# Patient Record
Sex: MALE | Race: WHITE | NOT HISPANIC OR LATINO | Employment: FULL TIME | URBAN - METROPOLITAN AREA
[De-identification: names, ages, dates, MRNs, and addresses within clinical notes are randomized per-mention and may not be internally consistent; named-entity substitution may affect disease eponyms.]

---

## 2018-06-19 ENCOUNTER — OFFICE VISIT (OUTPATIENT)
Dept: MEDICAL GROUP | Facility: CLINIC | Age: 33
End: 2018-06-19
Payer: COMMERCIAL

## 2018-06-19 VITALS
SYSTOLIC BLOOD PRESSURE: 134 MMHG | HEART RATE: 84 BPM | OXYGEN SATURATION: 96 % | HEIGHT: 70 IN | TEMPERATURE: 98.2 F | RESPIRATION RATE: 16 BRPM | DIASTOLIC BLOOD PRESSURE: 68 MMHG | BODY MASS INDEX: 32.07 KG/M2 | WEIGHT: 224 LBS

## 2018-06-19 DIAGNOSIS — E66.9 OBESITY (BMI 30-39.9): ICD-10-CM

## 2018-06-19 DIAGNOSIS — Z11.1 TUBERCULOSIS SCREENING: ICD-10-CM

## 2018-06-19 DIAGNOSIS — Z01.84 IMMUNITY STATUS TESTING: ICD-10-CM

## 2018-06-19 DIAGNOSIS — F17.200 SMOKER: ICD-10-CM

## 2018-06-19 DIAGNOSIS — Z00.00 ROUTINE GENERAL MEDICAL EXAMINATION AT A HEALTH CARE FACILITY: ICD-10-CM

## 2018-06-19 DIAGNOSIS — Z23 NEED FOR VACCINATION: ICD-10-CM

## 2018-06-19 PROBLEM — Z87.09 HISTORY OF ASTHMA: Status: ACTIVE | Noted: 2018-06-19

## 2018-06-19 PROBLEM — Z87.09 HISTORY OF ASTHMA: Status: RESOLVED | Noted: 2018-06-19 | Resolved: 2018-06-19

## 2018-06-19 PROCEDURE — 90715 TDAP VACCINE 7 YRS/> IM: CPT | Performed by: NURSE PRACTITIONER

## 2018-06-19 PROCEDURE — 99395 PREV VISIT EST AGE 18-39: CPT | Mod: 25 | Performed by: NURSE PRACTITIONER

## 2018-06-19 PROCEDURE — 90471 IMMUNIZATION ADMIN: CPT | Performed by: NURSE PRACTITIONER

## 2018-06-19 ASSESSMENT — PATIENT HEALTH QUESTIONNAIRE - PHQ9: CLINICAL INTERPRETATION OF PHQ2 SCORE: 0

## 2018-06-19 NOTE — PROGRESS NOTES
"CC: Annual Exam (plus vaccines;)        HPI:     Patricio presents today for the followin. Routine general medical examination at a health care facility  Here today stating requires an annual most specifically vaccine update for entry into [employer].  Plans to pursue a nursing degree.  He does not have any forms etc. however states he does not have any of his vaccine history.  He last went to high school in [place] and does not have access to any vaccine history at that time.  He does have a history of chickenpox as a child    2. Smoker  Not interested in quitting    3. Tuberculosis screening  Requires tuberculosis testing prior to entry.  He does have a history of a positive PPD, negative chest x-ray.  He has never been symptomatic and has no known exposure    4. Immunity status testing  Requires immunity testing as we do not have access to his previous immunization records.    5. Need for vaccination  Needs Tdap    6. Obesity (BMI 30-39.9)  Weight is currently down    Current Outpatient Prescriptions   Medication Sig Dispense Refill   • albuterol (VENTOLIN OR PROVENTIL) 108 (90 BASE) MCG/ACT AERS Inhale 2 Puffs by mouth every 6 hours as needed for Shortness of Breath. 8.5 g 3   • ibuprofen (MOTRIN) 800 MG TABS Take 1 Tab by mouth every 8 hours as needed for Mild Pain. 30 Tab 0     No current facility-administered medications for this visit.      Social History   Substance Use Topics   • Smoking status: Current Every Day Smoker     Packs/day: 1.00     Types: Cigarettes   • Smokeless tobacco: Never Used   • Alcohol use No     I reviewed patients allergies, problem list and medications today in EPIC.    ROS: Any/all pertinent positives listed in the HPI, otherwise all others reviewed are negative today.      /68   Pulse 84   Temp 36.8 °C (98.2 °F)   Resp 16   Ht 1.778 m (5' 10\")   Wt 101.6 kg (224 lb)   SpO2 96%   BMI 32.14 kg/m²     Exam:    Gen: Alert and oriented, No apparent distress. " WDWN  Psych: A+Ox3, normal affect and mood  Skin: Warm, dry and intact. Good turgor   No rashes in visible areas.  Eye: Conjunctiva clear, lids normal  ENMT: Lips without lesions, good dentition  Lungs: Clear to auscultation bilaterally, no rales or rhonchi   Unlabored respiratory effort.   CV: Regular rate and rhythm, S1, S2. No murmurs.   No Edema         Assessment and Plan.   32 y.o. male with the following issues.    1. Routine general medical examination at a health care facility  Physical normal.  Patient declines need for can panel her other routine blood work to be ordered    2. Smoker  Cessation encouraged    3. Tuberculosis screening  Ordered  - QUANTIFERON TB GOLD (IN TUBE)    4. Immunity status testing  Ordered  - HEP B SURFACE ANTIGEN; Future  - MUMPS AB IGG  - RUBELLA ABS IGG; Future  - RUBEOLA IGG AB; Future  - VARICELLA ZOSTER IGG AB    5. Need for vaccination  I have placed the below orders and discussed them with an approved delegating provider. The MA is performing the below orders under the direction of an office MD.  -Given today.  - TDAP VACCINE =>6YO IM    6. Obesity (BMI 30-39.9)  Weight is currently down  - Patient identified as having weight management issue.  Appropriate orders and counseling given.

## 2018-06-20 ENCOUNTER — HOSPITAL ENCOUNTER (OUTPATIENT)
Dept: LAB | Facility: MEDICAL CENTER | Age: 33
End: 2018-06-20
Attending: NURSE PRACTITIONER
Payer: COMMERCIAL

## 2018-06-20 DIAGNOSIS — Z01.84 IMMUNITY STATUS TESTING: ICD-10-CM

## 2018-06-20 LAB
HBV SURFACE AG SER QL: NEGATIVE
RUBV AB SER QL: 114.6 IU/ML

## 2018-06-20 PROCEDURE — 86480 TB TEST CELL IMMUN MEASURE: CPT

## 2018-06-20 PROCEDURE — 86735 MUMPS ANTIBODY: CPT

## 2018-06-20 PROCEDURE — 87340 HEPATITIS B SURFACE AG IA: CPT

## 2018-06-20 PROCEDURE — 86762 RUBELLA ANTIBODY: CPT

## 2018-06-20 PROCEDURE — 36415 COLL VENOUS BLD VENIPUNCTURE: CPT

## 2018-06-20 PROCEDURE — 86787 VARICELLA-ZOSTER ANTIBODY: CPT

## 2018-06-20 PROCEDURE — 86765 RUBEOLA ANTIBODY: CPT

## 2018-06-22 LAB
M TB TUBERC IFN-G BLD QL: NEGATIVE
M TB TUBERC IFN-G/MITOGEN IGNF BLD: -0
M TB TUBERC IGNF/MITOGEN IGNF CONTROL: 59.71 [IU]/ML
MEV IGG SER IA-ACNC: 2.23
MITOGEN IGNF BCKGRD COR BLD-ACNC: 0.03 [IU]/ML
MUV IGG SER IA-ACNC: 0.7
VZV IGG SER IA-ACNC: 3.41

## 2019-02-19 ENCOUNTER — OFFICE VISIT (OUTPATIENT)
Dept: MEDICAL GROUP | Age: 34
End: 2019-02-19
Payer: COMMERCIAL

## 2019-02-19 VITALS
SYSTOLIC BLOOD PRESSURE: 116 MMHG | WEIGHT: 216.6 LBS | OXYGEN SATURATION: 95 % | HEIGHT: 70 IN | BODY MASS INDEX: 31.01 KG/M2 | DIASTOLIC BLOOD PRESSURE: 78 MMHG | TEMPERATURE: 97.5 F | RESPIRATION RATE: 16 BRPM | HEART RATE: 88 BPM

## 2019-02-19 DIAGNOSIS — Z00.00 HEALTHCARE MAINTENANCE: ICD-10-CM

## 2019-02-19 DIAGNOSIS — F17.200 TOBACCO DEPENDENCE: ICD-10-CM

## 2019-02-19 DIAGNOSIS — Z23 NEEDS FLU SHOT: ICD-10-CM

## 2019-02-19 DIAGNOSIS — E66.9 OBESITY (BMI 30-39.9): ICD-10-CM

## 2019-02-19 PROCEDURE — 90471 IMMUNIZATION ADMIN: CPT | Performed by: INTERNAL MEDICINE

## 2019-02-19 PROCEDURE — 90686 IIV4 VACC NO PRSV 0.5 ML IM: CPT | Performed by: INTERNAL MEDICINE

## 2019-02-19 PROCEDURE — 99203 OFFICE O/P NEW LOW 30 MIN: CPT | Mod: 25 | Performed by: INTERNAL MEDICINE

## 2019-02-19 RX ORDER — BUPROPION HYDROCHLORIDE 150 MG/1
150 TABLET, EXTENDED RELEASE ORAL 2 TIMES DAILY
Qty: 60 TAB | Refills: 5 | Status: SHIPPED | OUTPATIENT
Start: 2019-02-19 | End: 2019-11-04

## 2019-02-19 ASSESSMENT — ENCOUNTER SYMPTOMS
RESPIRATORY NEGATIVE: 1
MUSCULOSKELETAL NEGATIVE: 1
EYES NEGATIVE: 1
NEUROLOGICAL NEGATIVE: 1
CONSTITUTIONAL NEGATIVE: 1
CARDIOVASCULAR NEGATIVE: 1
GASTROINTESTINAL NEGATIVE: 1

## 2019-02-19 ASSESSMENT — PATIENT HEALTH QUESTIONNAIRE - PHQ9: CLINICAL INTERPRETATION OF PHQ2 SCORE: 0

## 2019-02-19 ASSESSMENT — LIFESTYLE VARIABLES: SUBSTANCE_ABUSE: 1

## 2019-02-19 NOTE — PROGRESS NOTES
Subjective:   This is a new patient to me.  He has not established with new PCP.  Would like to get established with me.  Last seen by me 4 years ago.  So therefore new patient.  Patricio Lundberg is a 33 y.o. male who presents with Nicotine Dependence (help to quit smoking) and Elbow Injury (shooting pain in left elbow)        HPI    The patient is here for followup of chronic medical problems listed below. The patient is compliant with medications and having no side effects from them. Denies chest pain, abdominal pain, dyspnea, myalgias, or cough.    Patient would like to quit smoking. He states he has tried Chantix in the past, but it gave him nightmares. He has never tried Wellbutrin.    Patient is requesting influenza vaccine.    Patient Active Problem List   Diagnosis   • Smoker   • Obesity (BMI 30-39.9)   • Tobacco dependence       Outpatient Medications Prior to Visit   Medication Sig Dispense Refill   • albuterol (VENTOLIN OR PROVENTIL) 108 (90 BASE) MCG/ACT AERS Inhale 2 Puffs by mouth every 6 hours as needed for Shortness of Breath. (Patient not taking: Reported on 2/19/2019) 8.5 g 3   • ibuprofen (MOTRIN) 800 MG TABS Take 1 Tab by mouth every 8 hours as needed for Mild Pain. 30 Tab 0     No facility-administered medications prior to visit.         Allergies   Allergen Reactions   • Toradol Swelling     Per patient he felt like his throat was swelling shut.  After 10 minutes the sensation resolved       Review of Systems   Constitutional: Negative.    HENT: Negative.    Eyes: Negative.    Respiratory: Negative.    Cardiovascular: Negative.    Gastrointestinal: Negative.    Genitourinary: Negative.    Musculoskeletal: Negative.    Skin: Negative.    Neurological: Negative.    Endo/Heme/Allergies: Negative.    Psychiatric/Behavioral: Positive for substance abuse (nicotine).   All other systems reviewed and are negative.           Objective:     /78 (BP Location: Left arm, Patient Position:  "Sitting)   Pulse 88   Temp 36.4 °C (97.5 °F) (Temporal)   Resp 16   Ht 1.778 m (5' 10\")   Wt 98.2 kg (216 lb 9.6 oz)   SpO2 95%   BMI 31.08 kg/m²     Physical Exam   Constitutional: Oriented to person, place, and time. Appears well-developed and well-nourished. No distress.   Head: Normocephalic and atraumatic.   Right Ear: External ear normal.   Left Ear: External ear normal.   Nose: Nose normal.   Mouth/Throat: Oropharynx is clear and moist. No oropharyngeal exudate.   Eyes: Pupils are equal, round, and reactive to light. Conjunctivae and EOM are normal. Right eye exhibits no discharge. Left eye exhibits no discharge. No scleral icterus.   Neck: Normal range of motion. Neck supple. No JVD present. No tracheal deviation present. No thyromegaly present.   Cardiovascular: Normal rate, regular rhythm, normal heart sounds and intact distal pulses.  Exam reveals no gallop and no friction rub.    No murmur heard.  Pulmonary/Chest: Effort normal. No stridor. No respiratory distress. No wheezing or rales. No tenderness.   Abdominal: Soft. Bowel sounds are normal. No distension and no mass. There is no tenderness. There is no rebound and no guarding. No hernia.   Musculoskeletal: Normal range of motion No edema or tenderness.   Lymphadenopathy: No cervical adenopathy.   Neurological: Alert and oriented to person, place, and time. Normal reflexes. Normal reflexes. No cranial nerve deficit. Normal muscle tone. Coordination normal.   Skin: Skin is warm and dry. No rash noted. Not diaphoretic. No erythema. No pallor.   Psychiatric: Normal mood and affect. Behavior is normal. Judgment and thought content normal.   Nursing note and vitals reviewed.      No results found for: HBA1C  Lab Results   Component Value Date/Time    SODIUM 134 (L) 09/27/2008 12:30 PM    POTASSIUM 4.2 09/27/2008 12:30 PM    CHLORIDE 104 09/27/2008 12:30 PM    CO2 22 09/27/2008 12:30 PM    GLUCOSE 87 09/27/2008 12:30 PM    BUN 14 09/27/2008 12:30 PM "    CREATININE 1.0 09/27/2008 12:30 PM    ALKPHOSPHAT 78 09/27/2008 12:30 PM    ASTSGOT 33 09/27/2008 12:30 PM    ALTSGPT 49 09/27/2008 12:30 PM    TBILIRUBIN 0.7 09/27/2008 12:30 PM     No results found for: INR  No results found for: CHOLSTRLTOT, LDL, HDL, TRIGLYCERIDE    No results found for: TESTOSTERONE  No results found for: TSH  No results found for: FREET4  No results found for: URICACID  No components found for: VITB12  No results found for: 25HYDROXY       Assessment/Plan:     1. Tobacco dependence  Patient would like to quit smoking. Could not tolerate Chantix. Plan to evaluate with:    - buPROPion SR (WELLBUTRIN-SR) 150 MG TABLET SR 12 HR sustained-release tablet; Take 1 Tab by mouth 2 times a day.  Dispense: 60 Tab; Refill: 5    2. Needs flu shot  - Influenza Vaccine Quad Injection >3Y (PF)    3. Obesity (BMI 30-39.9)  Patient counseled on diet, exercise, 15 pounds of weight loss.    4. Healthcare maintenance-we will schedule patient for 2-month follow-up on smoking cessation and for annual health maintenance.  Plan to evaluate with:    - Comp Metabolic Panel; Future  - Lipid Profile; Future  - CBC WITH DIFFERENTIAL; Future      30 minute face-to-face encounter took place today.  More than half of this time was spent in the coordination of care of the above problems, as well as counseling.     Bernarda POWELL (Sravani), am scribing for, and in the presence of, Patricio Zarate M.D..    Electronically signed by: Bernarda Atwood (Sravani), 2/19/2019    Patricio POWELL M.D., personally performed the services described in this documentation, as scribed by Bernarda Atwood in my presence, and it is both accurate and complete.

## 2019-04-16 ENCOUNTER — TELEPHONE (OUTPATIENT)
Dept: MEDICAL GROUP | Age: 34
End: 2019-04-16

## 2019-04-16 DIAGNOSIS — Z11.1 SCREENING FOR TUBERCULOSIS: ICD-10-CM

## 2019-04-16 NOTE — TELEPHONE ENCOUNTER
1. Name: Marek Lundberg    Call Back Number: [phone]   Patient approves a detailed voicemail message: yes    2. Patient is requesting orders for TB Test    3. Clinical Reason for Request: N/A    4. Specialty & Provider Name/Lab/Imaging Location Preference: N/A    5. Is appointment scheduled for requested order/referral: yes -    Patient was informed they may receive a return phone call from our office with any additional questions before processing this request.

## 2019-04-17 ENCOUNTER — HOSPITAL ENCOUNTER (OUTPATIENT)
Dept: LAB | Facility: MEDICAL CENTER | Age: 34
End: 2019-04-17
Attending: INTERNAL MEDICINE
Payer: COMMERCIAL

## 2019-04-17 DIAGNOSIS — Z11.1 SCREENING FOR TUBERCULOSIS: ICD-10-CM

## 2019-04-17 DIAGNOSIS — Z00.00 HEALTHCARE MAINTENANCE: ICD-10-CM

## 2019-04-17 LAB
ALBUMIN SERPL BCP-MCNC: 4.2 G/DL (ref 3.2–4.9)
ALBUMIN/GLOB SERPL: 1.6 G/DL
ALP SERPL-CCNC: 74 U/L (ref 30–99)
ALT SERPL-CCNC: 54 U/L (ref 2–50)
ANION GAP SERPL CALC-SCNC: 6 MMOL/L (ref 0–11.9)
AST SERPL-CCNC: 27 U/L (ref 12–45)
BASOPHILS # BLD AUTO: 1 % (ref 0–1.8)
BASOPHILS # BLD: 0.08 K/UL (ref 0–0.12)
BILIRUB SERPL-MCNC: 0.5 MG/DL (ref 0.1–1.5)
BUN SERPL-MCNC: 14 MG/DL (ref 8–22)
CALCIUM SERPL-MCNC: 8.9 MG/DL (ref 8.5–10.5)
CHLORIDE SERPL-SCNC: 101 MMOL/L (ref 96–112)
CHOLEST SERPL-MCNC: 180 MG/DL (ref 100–199)
CO2 SERPL-SCNC: 27 MMOL/L (ref 20–33)
CREAT SERPL-MCNC: 0.89 MG/DL (ref 0.5–1.4)
EOSINOPHIL # BLD AUTO: 0.53 K/UL (ref 0–0.51)
EOSINOPHIL NFR BLD: 6.5 % (ref 0–6.9)
ERYTHROCYTE [DISTWIDTH] IN BLOOD BY AUTOMATED COUNT: 40.6 FL (ref 35.9–50)
FASTING STATUS PATIENT QL REPORTED: NORMAL
GLOBULIN SER CALC-MCNC: 2.6 G/DL (ref 1.9–3.5)
GLUCOSE SERPL-MCNC: 88 MG/DL (ref 65–99)
HCT VFR BLD AUTO: 52.3 % (ref 42–52)
HDLC SERPL-MCNC: 35 MG/DL
HGB BLD-MCNC: 16.8 G/DL (ref 14–18)
IMM GRANULOCYTES # BLD AUTO: 0.02 K/UL (ref 0–0.11)
IMM GRANULOCYTES NFR BLD AUTO: 0.2 % (ref 0–0.9)
LDLC SERPL CALC-MCNC: 104 MG/DL
LYMPHOCYTES # BLD AUTO: 3.19 K/UL (ref 1–4.8)
LYMPHOCYTES NFR BLD: 39.2 % (ref 22–41)
MCH RBC QN AUTO: 29.2 PG (ref 27–33)
MCHC RBC AUTO-ENTMCNC: 32.1 G/DL (ref 33.7–35.3)
MCV RBC AUTO: 91 FL (ref 81.4–97.8)
MONOCYTES # BLD AUTO: 0.65 K/UL (ref 0–0.85)
MONOCYTES NFR BLD AUTO: 8 % (ref 0–13.4)
NEUTROPHILS # BLD AUTO: 3.66 K/UL (ref 1.82–7.42)
NEUTROPHILS NFR BLD: 45.1 % (ref 44–72)
NRBC # BLD AUTO: 0 K/UL
NRBC BLD-RTO: 0 /100 WBC
PLATELET # BLD AUTO: 235 K/UL (ref 164–446)
PMV BLD AUTO: 10.5 FL (ref 9–12.9)
POTASSIUM SERPL-SCNC: 4.3 MMOL/L (ref 3.6–5.5)
PROT SERPL-MCNC: 6.8 G/DL (ref 6–8.2)
RBC # BLD AUTO: 5.75 M/UL (ref 4.7–6.1)
SODIUM SERPL-SCNC: 134 MMOL/L (ref 135–145)
TRIGL SERPL-MCNC: 207 MG/DL (ref 0–149)
WBC # BLD AUTO: 8.1 K/UL (ref 4.8–10.8)

## 2019-04-17 PROCEDURE — 80053 COMPREHEN METABOLIC PANEL: CPT

## 2019-04-17 PROCEDURE — 36415 COLL VENOUS BLD VENIPUNCTURE: CPT

## 2019-04-17 PROCEDURE — 85025 COMPLETE CBC W/AUTO DIFF WBC: CPT

## 2019-04-17 PROCEDURE — 86480 TB TEST CELL IMMUN MEASURE: CPT

## 2019-04-17 PROCEDURE — 80061 LIPID PANEL: CPT

## 2019-04-19 LAB
GAMMA INTERFERON BACKGROUND BLD IA-ACNC: 0.02 IU/ML
M TB IFN-G BLD-IMP: NEGATIVE
M TB IFN-G CD4+ BCKGRND COR BLD-ACNC: 0 IU/ML
MITOGEN IGNF BCKGRD COR BLD-ACNC: >10 IU/ML
QFT TB2 - NIL TBQ2: -0.01 IU/ML

## 2019-04-23 ENCOUNTER — OFFICE VISIT (OUTPATIENT)
Dept: MEDICAL GROUP | Age: 34
End: 2019-04-23
Payer: COMMERCIAL

## 2019-04-23 VITALS
TEMPERATURE: 98.1 F | HEIGHT: 70 IN | HEART RATE: 80 BPM | WEIGHT: 219.2 LBS | SYSTOLIC BLOOD PRESSURE: 102 MMHG | OXYGEN SATURATION: 93 % | DIASTOLIC BLOOD PRESSURE: 70 MMHG | BODY MASS INDEX: 31.38 KG/M2

## 2019-04-23 DIAGNOSIS — E66.9 OBESITY (BMI 30-39.9): ICD-10-CM

## 2019-04-23 DIAGNOSIS — E78.2 MIXED HYPERLIPIDEMIA: ICD-10-CM

## 2019-04-23 DIAGNOSIS — Z00.00 ROUTINE GENERAL MEDICAL EXAMINATION AT A HEALTH CARE FACILITY: ICD-10-CM

## 2019-04-23 DIAGNOSIS — F17.200 TOBACCO DEPENDENCE: ICD-10-CM

## 2019-04-23 DIAGNOSIS — R74.8 ELEVATED LIVER ENZYMES: ICD-10-CM

## 2019-04-23 DIAGNOSIS — Z23 NEED FOR VACCINATION: ICD-10-CM

## 2019-04-23 PROCEDURE — 90732 PPSV23 VACC 2 YRS+ SUBQ/IM: CPT | Performed by: INTERNAL MEDICINE

## 2019-04-23 PROCEDURE — 90471 IMMUNIZATION ADMIN: CPT | Performed by: INTERNAL MEDICINE

## 2019-04-23 PROCEDURE — 99214 OFFICE O/P EST MOD 30 MIN: CPT | Mod: 25 | Performed by: INTERNAL MEDICINE

## 2019-04-23 PROCEDURE — 90746 HEPB VACCINE 3 DOSE ADULT IM: CPT | Performed by: INTERNAL MEDICINE

## 2019-04-23 PROCEDURE — 90472 IMMUNIZATION ADMIN EACH ADD: CPT | Performed by: INTERNAL MEDICINE

## 2019-04-23 ASSESSMENT — ENCOUNTER SYMPTOMS
RESPIRATORY NEGATIVE: 1
GASTROINTESTINAL NEGATIVE: 1
PSYCHIATRIC NEGATIVE: 1
CARDIOVASCULAR NEGATIVE: 1
NEUROLOGICAL NEGATIVE: 1
CONSTITUTIONAL NEGATIVE: 1
MUSCULOSKELETAL NEGATIVE: 1
EYES NEGATIVE: 1

## 2019-04-23 NOTE — PROGRESS NOTES
"Subjective:      Patricio Lundberg is a 33 y.o. male who presents with Annual Exam        HPI    Physical exam  Patient presents today for a physical for school and for his vaccination records. He is up to date on all his vaccinations aside from the hepatitis B series.     Patient Active Problem List   Diagnosis   • Smoker   • Obesity (BMI 30-39.9)   • Tobacco dependence       Outpatient Medications Prior to Visit   Medication Sig Dispense Refill   • albuterol (VENTOLIN OR PROVENTIL) 108 (90 BASE) MCG/ACT AERS Inhale 2 Puffs by mouth every 6 hours as needed for Shortness of Breath. 8.5 g 3   • ibuprofen (MOTRIN) 800 MG TABS Take 1 Tab by mouth every 8 hours as needed for Mild Pain. 30 Tab 0   • buPROPion SR (WELLBUTRIN-SR) 150 MG TABLET SR 12 HR sustained-release tablet Take 1 Tab by mouth 2 times a day. (Patient not taking: Reported on 4/23/2019) 60 Tab 5     No facility-administered medications prior to visit.         Allergies   Allergen Reactions   • Toradol Swelling     Per patient he felt like his throat was swelling shut.  After 10 minutes the sensation resolved       Review of Systems   Constitutional: Negative.    HENT: Negative.    Eyes: Negative.    Respiratory: Negative.    Cardiovascular: Negative.    Gastrointestinal: Negative.    Genitourinary: Negative.    Musculoskeletal: Negative.    Skin: Negative.    Neurological: Negative.    Endo/Heme/Allergies: Negative.    Psychiatric/Behavioral: Negative.    All other systems reviewed and are negative.           Objective:     /70 (BP Location: Left arm, Patient Position: Sitting, BP Cuff Size: Adult)   Pulse 80   Temp 36.7 °C (98.1 °F) (Temporal)   Ht 1.778 m (5' 10\")   Wt 99.4 kg (219 lb 3.2 oz)   SpO2 93%   BMI 31.45 kg/m²     Physical Exam   Constitutional: Oriented to person, place, and time. Appears well-developed and well-nourished. No distress.   Head: Normocephalic and atraumatic.   Right Ear: External ear normal.   Left Ear: " External ear normal.   Nose: Nose normal.   Mouth/Throat: Oropharynx is clear and moist. No oropharyngeal exudate.   Eyes: Pupils are equal, round, and reactive to light. Conjunctivae and EOM are normal. Right eye exhibits no discharge. Left eye exhibits no discharge. No scleral icterus.   Neck: Normal range of motion. Neck supple. No JVD present. No tracheal deviation present. No thyromegaly present.   Cardiovascular: Normal rate, regular rhythm, normal heart sounds and intact distal pulses.  Exam reveals no gallop and no friction rub.    No murmur heard.  Pulmonary/Chest: Effort normal. No stridor. No respiratory distress. No wheezing or rales. No tenderness.   Abdominal: Soft. Bowel sounds are normal. No distension and no mass. There is no tenderness. There is no rebound and no guarding. No hernia.   Musculoskeletal: Normal range of motion No edema or tenderness.   Lymphadenopathy: No cervical adenopathy.   Neurological: Alert and oriented to person, place, and time. Normal reflexes. Normal reflexes. No cranial nerve deficit. Normal muscle tone. Coordination normal.   Skin: Skin is warm and dry. No rash noted. Not diaphoretic. No erythema. No pallor.   Psychiatric: Normal mood and affect. Behavior is normal. Judgment and thought content normal.   Nursing note and vitals reviewed.      No results found for: HBA1C  Lab Results   Component Value Date/Time    SODIUM 134 (L) 04/17/2019 08:42 AM    POTASSIUM 4.3 04/17/2019 08:42 AM    CHLORIDE 101 04/17/2019 08:42 AM    CO2 27 04/17/2019 08:42 AM    GLUCOSE 88 04/17/2019 08:42 AM    BUN 14 04/17/2019 08:42 AM    CREATININE 0.89 04/17/2019 08:42 AM    CREATININE 1.0 09/27/2008 12:30 PM    ALKPHOSPHAT 74 04/17/2019 08:42 AM    ASTSGOT 27 04/17/2019 08:42 AM    ALTSGPT 54 (H) 04/17/2019 08:42 AM    TBILIRUBIN 0.5 04/17/2019 08:42 AM     No results found for: INR  Lab Results   Component Value Date/Time    CHOLSTRLTOT 180 04/17/2019 08:42 AM     (H) 04/17/2019 08:42  AM    HDL 35 (A) 04/17/2019 08:42 AM    TRIGLYCERIDE 207 (H) 04/17/2019 08:42 AM       No results found for: TESTOSTERONE  No results found for: TSH  No results found for: FREET4  No results found for: URICACID  No components found for: VITB12  No results found for: 25HYDROXY       Assessment/Plan:     1. Routine general medical examination at a health care facility  Conducted physical examination, findings above.      2. Need for vaccination  Ordered first hepatitis B vaccination in series.   - Pneumococal Polysaccharide Vaccine 23-Valent =>1YO SQ/IM  - Hepatitis B Vaccine Adult IM      3.Tobacco dependence  Patient counseled. Encouraged to quit tobacco products. NicoDerm prescribed.      4. Obesity (BMI 30-39.9)   diet/exercise/lose 15 lbs.; patient counseled      5. Elevated liver enzymes- borderline; needs angela 3 mos; prob fatty liver- will do US liver if it persists   diet/exercise/lose 15 lbs.; patient counseled; kck ferritin and hep c jodi      6. Mixed hyperlipidemia- mild no meds   diet/exercise/lose 15 lbs.; patient counseled                          30 minute face-to-face encounter took place today.  More than half of this time was spent in the coordination of care of the above problems, as well as counseling.     IJean-Paul (Scribe), am scribing for, and in the presence of, Patricio Zarate M.D..    Electronically signed by: Jean-Paul Church (Scribe), 4/23/2019    IPatricio M.D., personally performed the services described in this documentation, as scribed by Jean-Paul Church in my presence, and it is both accurate and complete.

## 2019-04-23 NOTE — LETTER
April 23, 2019         Patient: Patricio Lundberg   YOB: 1985   Date of Visit: 4/23/2019           To Whom it May Concern:    Patricio Lundberg was seen in my clinic on 4/23/2019. 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    If you have any questions or concerns, please don't hesitate to call.        Sincerely,           Patricio Zarate M.D.  Electronically Signed

## 2019-05-30 ENCOUNTER — TELEPHONE (OUTPATIENT)
Dept: MEDICAL GROUP | Age: 34
End: 2019-05-30

## 2019-05-30 ENCOUNTER — APPOINTMENT (OUTPATIENT)
Dept: MEDICAL GROUP | Age: 34
End: 2019-05-30
Payer: COMMERCIAL

## 2019-05-30 DIAGNOSIS — Z23 NEED FOR VACCINATION: ICD-10-CM

## 2019-08-29 ENCOUNTER — NON-PROVIDER VISIT (OUTPATIENT)
Dept: MEDICAL GROUP | Age: 34
End: 2019-08-29
Payer: COMMERCIAL

## 2019-08-29 DIAGNOSIS — Z23 NEED FOR VACCINATION: ICD-10-CM

## 2019-08-29 PROCEDURE — 90746 HEPB VACCINE 3 DOSE ADULT IM: CPT | Performed by: INTERNAL MEDICINE

## 2019-08-29 PROCEDURE — 90471 IMMUNIZATION ADMIN: CPT | Performed by: INTERNAL MEDICINE

## 2019-08-29 NOTE — PROGRESS NOTES
Subjective:      Patricio Lundberg is a 33 y.o. male who presents with No chief complaint on file.            HPI    ROS       Objective:     There were no vitals taken for this visit.     Physical Exam            Assessment/Plan:     1. Need for vaccination  ***  - Hepatitis B Vaccine Adult IM

## 2019-08-29 NOTE — PROGRESS NOTES
"Patricio Lundberg is a 33 y.o. male here for a non-provider visit for:   HEPATITIS B 3 of 3    Reason for immunization: continue or complete series started at the office  Immunization records indicate need for vaccine: Yes, confirmed with personal records  Minimum interval has been met for this vaccine: Yes  ABN completed: Not Indicated    Order and dose verified by: KIARA CH Dated  10/12/2018 was given to patient: Yes  All IAC Questionnaire questions were answered \"No.\"    Patient tolerated injection and no adverse effects were observed or reported: Yes    Pt scheduled for next dose in series: No    "

## 2019-09-17 ENCOUNTER — EMPLOYEE HEALTH (OUTPATIENT)
Dept: OCCUPATIONAL MEDICINE | Facility: CLINIC | Age: 34
End: 2019-09-17

## 2019-09-17 ENCOUNTER — HOSPITAL ENCOUNTER (OUTPATIENT)
Facility: MEDICAL CENTER | Age: 34
End: 2019-09-17
Attending: PREVENTIVE MEDICINE
Payer: COMMERCIAL

## 2019-09-17 ENCOUNTER — EH NON-PROVIDER (OUTPATIENT)
Dept: OCCUPATIONAL MEDICINE | Facility: CLINIC | Age: 34
End: 2019-09-17

## 2019-09-17 VITALS
DIASTOLIC BLOOD PRESSURE: 86 MMHG | SYSTOLIC BLOOD PRESSURE: 124 MMHG | BODY MASS INDEX: 31.64 KG/M2 | HEART RATE: 73 BPM | HEIGHT: 70 IN | WEIGHT: 221 LBS | OXYGEN SATURATION: 97 % | TEMPERATURE: 97.8 F

## 2019-09-17 DIAGNOSIS — Z02.1 PRE-EMPLOYMENT HEALTH SCREENING EXAMINATION: ICD-10-CM

## 2019-09-17 DIAGNOSIS — Z02.1 PRE-EMPLOYMENT DRUG SCREENING: ICD-10-CM

## 2019-09-17 LAB
AMP AMPHETAMINE: NORMAL
BAR BARBITURATES: NORMAL
BZO BENZODIAZEPINES: NORMAL
COC COCAINE: NORMAL
INT CON NEG: NORMAL
INT CON POS: NORMAL
MDMA ECSTASY: NORMAL
MET METHAMPHETAMINES: NORMAL
MTD METHADONE: NORMAL
OPI OPIATES: NORMAL
OXY OXYCODONE: NORMAL
PCP PHENCYCLIDINE: NORMAL
POC URINE DRUG SCREEN OCDRS: NEGATIVE
THC: NORMAL

## 2019-09-17 PROCEDURE — 8915 PR COMPREHENSIVE PHYSICAL: Performed by: NURSE PRACTITIONER

## 2019-09-17 PROCEDURE — 86480 TB TEST CELL IMMUN MEASURE: CPT | Performed by: PREVENTIVE MEDICINE

## 2019-09-17 PROCEDURE — 90686 IIV4 VACC NO PRSV 0.5 ML IM: CPT | Performed by: NURSE PRACTITIONER

## 2019-09-17 PROCEDURE — 80305 DRUG TEST PRSMV DIR OPT OBS: CPT | Performed by: PREVENTIVE MEDICINE

## 2019-09-18 LAB
GAMMA INTERFERON BACKGROUND BLD IA-ACNC: 0.03 IU/ML
M TB IFN-G BLD-IMP: NEGATIVE
M TB IFN-G CD4+ BCKGRND COR BLD-ACNC: 0 IU/ML
MITOGEN IGNF BCKGRD COR BLD-ACNC: >10 IU/ML
QFT TB2 - NIL TBQ2: 0 IU/ML

## 2019-09-20 ENCOUNTER — EH NON-PROVIDER (OUTPATIENT)
Dept: OCCUPATIONAL MEDICINE | Facility: CLINIC | Age: 34
End: 2019-09-20

## 2019-09-20 DIAGNOSIS — Z71.85 IMMUNIZATION COUNSELING: ICD-10-CM

## 2019-11-04 ENCOUNTER — OFFICE VISIT (OUTPATIENT)
Dept: MEDICAL GROUP | Age: 34
End: 2019-11-04
Payer: COMMERCIAL

## 2019-11-04 VITALS
HEART RATE: 79 BPM | DIASTOLIC BLOOD PRESSURE: 70 MMHG | HEIGHT: 70 IN | TEMPERATURE: 97.5 F | SYSTOLIC BLOOD PRESSURE: 118 MMHG | BODY MASS INDEX: 32.5 KG/M2 | OXYGEN SATURATION: 96 % | WEIGHT: 227 LBS

## 2019-11-04 DIAGNOSIS — F17.200 TOBACCO DEPENDENCE: ICD-10-CM

## 2019-11-04 DIAGNOSIS — E78.2 MIXED HYPERLIPIDEMIA: ICD-10-CM

## 2019-11-04 DIAGNOSIS — F98.8 ATTENTION DEFICIT DISORDER (ADD) WITHOUT HYPERACTIVITY: ICD-10-CM

## 2019-11-04 DIAGNOSIS — R74.8 ELEVATED LIVER ENZYMES: ICD-10-CM

## 2019-11-04 DIAGNOSIS — E66.9 OBESITY (BMI 30-39.9): ICD-10-CM

## 2019-11-04 DIAGNOSIS — G47.19 EXCESSIVE DAYTIME SLEEPINESS: ICD-10-CM

## 2019-11-04 DIAGNOSIS — R53.83 LOW ENERGY: ICD-10-CM

## 2019-11-04 PROCEDURE — 99214 OFFICE O/P EST MOD 30 MIN: CPT | Performed by: INTERNAL MEDICINE

## 2019-11-04 RX ORDER — MODAFINIL 100 MG/1
100 TABLET ORAL DAILY
Qty: 90 TAB | Refills: 0 | Status: SHIPPED | OUTPATIENT
Start: 2019-11-04 | End: 2019-11-18 | Stop reason: SDUPTHER

## 2019-11-04 ASSESSMENT — ENCOUNTER SYMPTOMS
NEUROLOGICAL NEGATIVE: 1
CARDIOVASCULAR NEGATIVE: 1
WEIGHT LOSS: 0
MUSCULOSKELETAL NEGATIVE: 1
DIAPHORESIS: 0
RESPIRATORY NEGATIVE: 1
CHILLS: 0
FEVER: 0
EYES NEGATIVE: 1
PSYCHIATRIC NEGATIVE: 1
GASTROINTESTINAL NEGATIVE: 1

## 2019-11-04 NOTE — PROGRESS NOTES
Subjective:      Patricio Lundberg is a 34 y.o. male who presents with Fatigue ( and concentation )        HPI    The patient is here for followup of chronic medical problems listed below. The patient is compliant with medications and having no side effects from them. Denies chest pain, abdominal pain, dyspnea, myalgias, or cough. He is additionally following up for his recent lab work results. He notes that he has received his flu vaccination this season.     1. Obesity (BMI 30-39.9)    The patient is attempting to manage through diet and exercise.    2. Tobacco dependence    He is managing tobacco dependence well.     3. Low energy  4. 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 He is requesting medication to alleviate these symptoms. He expresses concern with amphetamines.     5. 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 He is requesting medication to alleviate these symptoms. He expresses concern with amphetamines.           Patient Active Problem List   Diagnosis   • Obesity (BMI 30-39.9)   • Tobacco dependence   • Mixed hyperlipidemia- mild no meds   • Elevated liver enzymes- borderline; needs angela 3 mos; prob fatty liver       Outpatient Medications Prior to Visit   Medication Sig Dispense Refill   • buPROPion SR (WELLBUTRIN-SR) 150 MG TABLET SR 12 HR sustained-release tablet Take 1 Tab by mouth 2 times a day. (Patient not taking: Reported on 4/23/2019) 60 Tab 5   • albuterol (VENTOLIN OR PROVENTIL) 108 (90 BASE) MCG/ACT AERS Inhale 2 Puffs by mouth every 6 hours as needed for Shortness of Breath. (Patient not taking: Reported on 9/17/2019) 8.5 g 3   • ibuprofen (MOTRIN) 800 MG TABS Take 1 Tab by mouth every 8 hours as needed for Mild Pain.  "(Patient not taking: Reported on 9/17/2019) 30 Tab 0     No facility-administered medications prior to visit.         Allergies   Allergen Reactions   • Toradol Swelling     Per patient he felt like his throat was swelling shut.  After 10 minutes the sensation resolved       Review of Systems   Constitutional: Positive for malaise/fatigue. Negative for chills, diaphoresis, fever and weight loss.        Difficulty focusing   HENT: Negative.    Eyes: Negative.    Respiratory: Negative.    Cardiovascular: Negative.    Gastrointestinal: Negative.    Genitourinary: Negative.    Musculoskeletal: Negative.    Skin: Negative.    Neurological: Negative.    Psychiatric/Behavioral: Negative.    All other systems reviewed and are negative.           Objective:     /70 (BP Location: Left arm, Patient Position: Sitting, BP Cuff Size: Adult)   Pulse 79   Temp 36.4 °C (97.5 °F)   Ht 1.778 m (5' 10\")   Wt 103 kg (227 lb)   SpO2 96%   BMI 32.57 kg/m²     Physical Exam   Constitutional: Oriented to person, place, and time. Appears well-developed and well-nourished. No distress.   Head: Normocephalic and atraumatic.   Right Ear: External ear normal.   Left Ear: External ear normal.   Nose: Nose normal.   Mouth/Throat: Oropharynx is clear and moist. No oropharyngeal exudate.   Eyes: Pupils are equal, round, and reactive to light. Conjunctivae and EOM are normal. Right eye exhibits no discharge. Left eye exhibits no discharge. No scleral icterus.   Neck: Normal range of motion. Neck supple. No JVD present. No tracheal deviation present. No thyromegaly present.   Cardiovascular: Normal rate, regular rhythm, normal heart sounds and intact distal pulses.  Exam reveals no gallop and no friction rub.    No murmur heard.  Pulmonary/Chest: Effort normal. No stridor. No respiratory distress. No wheezing or rales. No tenderness.   Abdominal: Soft. Bowel sounds are normal. No distension and no mass. There is no tenderness. There is no " rebound and no guarding. No hernia.   Musculoskeletal: Normal range of motion No edema or tenderness.   Lymphadenopathy: No cervical adenopathy.   Neurological: Alert and oriented to person, place, and time. Normal reflexes. Normal reflexes. No cranial nerve deficit. Normal muscle tone. Coordination normal.   Skin: Skin is warm and dry. No rash noted. Not diaphoretic. No erythema. No pallor.   Psychiatric: Normal mood and affect. Behavior is normal. Judgment and thought content normal.   Nursing note and vitals reviewed.      No results found for: HBA1C  Lab Results   Component Value Date/Time    SODIUM 134 (L) 04/17/2019 08:42 AM    POTASSIUM 4.3 04/17/2019 08:42 AM    CHLORIDE 101 04/17/2019 08:42 AM    CO2 27 04/17/2019 08:42 AM    GLUCOSE 88 04/17/2019 08:42 AM    BUN 14 04/17/2019 08:42 AM    CREATININE 0.89 04/17/2019 08:42 AM    CREATININE 1.0 09/27/2008 12:30 PM    ALKPHOSPHAT 74 04/17/2019 08:42 AM    ASTSGOT 27 04/17/2019 08:42 AM    ALTSGPT 54 (H) 04/17/2019 08:42 AM    TBILIRUBIN 0.5 04/17/2019 08:42 AM     No results found for: INR  Lab Results   Component Value Date/Time    CHOLSTRLTOT 180 04/17/2019 08:42 AM     (H) 04/17/2019 08:42 AM    HDL 35 (A) 04/17/2019 08:42 AM    TRIGLYCERIDE 207 (H) 04/17/2019 08:42 AM       No results found for: TESTOSTERONE  No results found for: TSH  No results found for: FREET4  No results found for: URICACID  No components found for: VITB12  No results found for: 25HYDROXY       Assessment/Plan:     1. Elevated liver enzymes- borderline; needs angela 3 mos; prob fatty liver    He will receive blood work and a US-Abdomen for evaluation of his daytime sleepiness and low energy levels.     Results for GLORIA CURTIS PETERS (MRN [id number]) as of 11/4/2019 09:22   Ref. Range 4/17/2019 08:42   ALT(SGPT) Latest Ref Range: 2 - 50 U/L 54 (H)       - HEP C VIRUS ANTIBODY; Future  - US-ABDOMEN COMPLETE SURVEY; Future    2. Mixed hyperlipidemia- mild no meds    He will receive  blood work for reevaluation. Diet/exercise/lose 15 lbs.; patient counseled.    Results for CURTIS CASTRO (MRN [id number]) as of 11/4/2019 09:22   Ref. Range 4/17/2019 08:42   Cholesterol,Tot Latest Ref Range: 100 - 199 mg/dL 180   Triglycerides Latest Ref Range: 0 - 149 mg/dL 207 (H)   HDL Latest Ref Range: >=40 mg/dL 35 (A)   LDL Latest Ref Range: <100 mg/dL 104 (H)       - TSH; Future  - Comp Metabolic Panel; Future  - Lipid Profile; Future  - CRP HIGH SENSITIVE (CARDIAC); Future    3. Obesity (BMI 30-39.9)    Diet/exercise/lose 15 lbs.; patient counseled     5. Low energy  Acute Problem    He will receive blood work for evaluation of his daytime sleepiness and low energy levels. He will be prescribed with Vitamin B12, Folate, and Provigil 100 mg.      - TSH; Future  - Comp Metabolic Panel; Future  - CBC WITH DIFFERENTIAL; Future  - WESTERGREN SED RATE; Future  - TESTOSTERONE, FREE AND TOTAL; Future  - VITAMIN B12; Future  - FOLATE; Future  - modafinil (PROVIGIL) 100 MG Tab; Take 1 Tab by mouth every day for 90 days.  Dispense: 90 Tab; Refill: 0    6. Excessive daytime sleepiness  Acute Problem    He will be prescribed Provigil 100 mg. Diet/exercise/lose 15 lbs.; patient counseled     - modafinil (PROVIGIL) 100 MG Tab; Take 1 Tab by mouth every day for 90 days.  Dispense: 90 Tab; Refill: 0    7. Attention deficit disorder (ADD) without hyperactivity  - modafinil (PROVIGIL) 100 MG Tab; Take 1 Tab by mouth every day for 90 days.  Dispense: 90 Tab; Refill: 0    He will be prescribed Provigil 100 mg.        30 minute face-to-face encounter took place today.  More than half of this time was spent in the coordination of care of the above problems, as well as counseling.     Dominic POWELL (Sravani), am scribing for, and in the presence of, Curtis Zarate M.D..    Electronically signed by: Dominic Meza), 11/4/2019    Curtis POWELL M.D., personally performed the services described in this  documentation, as scribed by Dominic Butler in my presence, and it is both accurate and complete.

## 2019-11-14 ENCOUNTER — HOSPITAL ENCOUNTER (OUTPATIENT)
Dept: RADIOLOGY | Facility: MEDICAL CENTER | Age: 34
End: 2019-11-14
Attending: INTERNAL MEDICINE
Payer: COMMERCIAL

## 2019-11-14 ENCOUNTER — HOSPITAL ENCOUNTER (OUTPATIENT)
Dept: LAB | Facility: MEDICAL CENTER | Age: 34
End: 2019-11-14
Attending: INTERNAL MEDICINE
Payer: COMMERCIAL

## 2019-11-14 DIAGNOSIS — E78.2 MIXED HYPERLIPIDEMIA: ICD-10-CM

## 2019-11-14 DIAGNOSIS — R53.83 LOW ENERGY: ICD-10-CM

## 2019-11-14 DIAGNOSIS — R74.8 ELEVATED LIVER ENZYMES: ICD-10-CM

## 2019-11-14 LAB
ALBUMIN SERPL BCP-MCNC: 4.5 G/DL (ref 3.2–4.9)
ALBUMIN/GLOB SERPL: 1.7 G/DL
ALP SERPL-CCNC: 65 U/L (ref 30–99)
ALT SERPL-CCNC: 39 U/L (ref 2–50)
ANION GAP SERPL CALC-SCNC: 9 MMOL/L (ref 0–11.9)
AST SERPL-CCNC: 24 U/L (ref 12–45)
BASOPHILS # BLD AUTO: 1.2 % (ref 0–1.8)
BASOPHILS # BLD: 0.11 K/UL (ref 0–0.12)
BILIRUB SERPL-MCNC: 0.5 MG/DL (ref 0.1–1.5)
BUN SERPL-MCNC: 16 MG/DL (ref 8–22)
CALCIUM SERPL-MCNC: 9.1 MG/DL (ref 8.5–10.5)
CHLORIDE SERPL-SCNC: 106 MMOL/L (ref 96–112)
CHOLEST SERPL-MCNC: 192 MG/DL (ref 100–199)
CO2 SERPL-SCNC: 24 MMOL/L (ref 20–33)
CREAT SERPL-MCNC: 0.97 MG/DL (ref 0.5–1.4)
CRP SERPL HS-MCNC: 1.6 MG/L (ref 0–7.5)
EOSINOPHIL # BLD AUTO: 0.47 K/UL (ref 0–0.51)
EOSINOPHIL NFR BLD: 5.2 % (ref 0–6.9)
ERYTHROCYTE [DISTWIDTH] IN BLOOD BY AUTOMATED COUNT: 39.3 FL (ref 35.9–50)
ERYTHROCYTE [SEDIMENTATION RATE] IN BLOOD BY WESTERGREN METHOD: 6 MM/HOUR (ref 0–15)
GLOBULIN SER CALC-MCNC: 2.6 G/DL (ref 1.9–3.5)
GLUCOSE SERPL-MCNC: 86 MG/DL (ref 65–99)
HCT VFR BLD AUTO: 50.8 % (ref 42–52)
HDLC SERPL-MCNC: 34 MG/DL
HGB BLD-MCNC: 16.8 G/DL (ref 14–18)
IMM GRANULOCYTES # BLD AUTO: 0.03 K/UL (ref 0–0.11)
IMM GRANULOCYTES NFR BLD AUTO: 0.3 % (ref 0–0.9)
LDLC SERPL CALC-MCNC: 121 MG/DL
LYMPHOCYTES # BLD AUTO: 3.21 K/UL (ref 1–4.8)
LYMPHOCYTES NFR BLD: 35.6 % (ref 22–41)
MCH RBC QN AUTO: 29.5 PG (ref 27–33)
MCHC RBC AUTO-ENTMCNC: 33.1 G/DL (ref 33.7–35.3)
MCV RBC AUTO: 89.3 FL (ref 81.4–97.8)
MONOCYTES # BLD AUTO: 0.78 K/UL (ref 0–0.85)
MONOCYTES NFR BLD AUTO: 8.6 % (ref 0–13.4)
NEUTROPHILS # BLD AUTO: 4.42 K/UL (ref 1.82–7.42)
NEUTROPHILS NFR BLD: 49.1 % (ref 44–72)
NRBC # BLD AUTO: 0 K/UL
NRBC BLD-RTO: 0 /100 WBC
PLATELET # BLD AUTO: 260 K/UL (ref 164–446)
PMV BLD AUTO: 10.3 FL (ref 9–12.9)
POTASSIUM SERPL-SCNC: 4.4 MMOL/L (ref 3.6–5.5)
PROT SERPL-MCNC: 7.1 G/DL (ref 6–8.2)
RBC # BLD AUTO: 5.69 M/UL (ref 4.7–6.1)
SODIUM SERPL-SCNC: 139 MMOL/L (ref 135–145)
TRIGL SERPL-MCNC: 186 MG/DL (ref 0–149)
WBC # BLD AUTO: 9 K/UL (ref 4.8–10.8)

## 2019-11-14 PROCEDURE — 82728 ASSAY OF FERRITIN: CPT

## 2019-11-14 PROCEDURE — 82746 ASSAY OF FOLIC ACID SERUM: CPT

## 2019-11-14 PROCEDURE — 84443 ASSAY THYROID STIM HORMONE: CPT

## 2019-11-14 PROCEDURE — 80053 COMPREHEN METABOLIC PANEL: CPT

## 2019-11-14 PROCEDURE — 82607 VITAMIN B-12: CPT

## 2019-11-14 PROCEDURE — 86803 HEPATITIS C AB TEST: CPT

## 2019-11-14 PROCEDURE — 84403 ASSAY OF TOTAL TESTOSTERONE: CPT

## 2019-11-14 PROCEDURE — 84270 ASSAY OF SEX HORMONE GLOBUL: CPT

## 2019-11-14 PROCEDURE — 80061 LIPID PANEL: CPT

## 2019-11-14 PROCEDURE — 36415 COLL VENOUS BLD VENIPUNCTURE: CPT

## 2019-11-14 PROCEDURE — 76700 US EXAM ABDOM COMPLETE: CPT

## 2019-11-14 PROCEDURE — 85652 RBC SED RATE AUTOMATED: CPT

## 2019-11-14 PROCEDURE — 86141 C-REACTIVE PROTEIN HS: CPT

## 2019-11-14 PROCEDURE — 85025 COMPLETE CBC W/AUTO DIFF WBC: CPT

## 2019-11-15 LAB
FERRITIN SERPL-MCNC: 227.7 NG/ML (ref 22–322)
FOLATE SERPL-MCNC: 8.7 NG/ML
HCV AB SER QL: NEGATIVE
TSH SERPL DL<=0.005 MIU/L-ACNC: 2.01 UIU/ML (ref 0.38–5.33)
VIT B12 SERPL-MCNC: 639 PG/ML (ref 211–911)

## 2019-11-16 LAB
SHBG SERPL-SCNC: 19 NMOL/L (ref 11–80)
TESTOST FREE MFR SERPL: 2.3 % (ref 1.6–2.9)
TESTOST FREE SERPL-MCNC: 68 PG/ML (ref 47–244)
TESTOST SERPL-MCNC: 293 NG/DL (ref 300–1080)

## 2019-11-18 ENCOUNTER — OFFICE VISIT (OUTPATIENT)
Dept: MEDICAL GROUP | Age: 34
End: 2019-11-18
Payer: COMMERCIAL

## 2019-11-18 VITALS
WEIGHT: 224.2 LBS | OXYGEN SATURATION: 95 % | HEIGHT: 70 IN | SYSTOLIC BLOOD PRESSURE: 118 MMHG | BODY MASS INDEX: 32.1 KG/M2 | TEMPERATURE: 98.7 F | DIASTOLIC BLOOD PRESSURE: 80 MMHG | HEART RATE: 77 BPM

## 2019-11-18 DIAGNOSIS — G47.19 EXCESSIVE DAYTIME SLEEPINESS: ICD-10-CM

## 2019-11-18 DIAGNOSIS — R53.83 LOW ENERGY: ICD-10-CM

## 2019-11-18 DIAGNOSIS — F98.8 ATTENTION DEFICIT DISORDER (ADD) WITHOUT HYPERACTIVITY: ICD-10-CM

## 2019-11-18 DIAGNOSIS — E66.9 OBESITY (BMI 30-39.9): ICD-10-CM

## 2019-11-18 DIAGNOSIS — E78.2 MIXED HYPERLIPIDEMIA: ICD-10-CM

## 2019-11-18 PROCEDURE — 99214 OFFICE O/P EST MOD 30 MIN: CPT | Performed by: INTERNAL MEDICINE

## 2019-11-18 RX ORDER — MODAFINIL 100 MG/1
200 TABLET ORAL DAILY
Qty: 180 TAB | Refills: 1 | Status: SHIPPED | OUTPATIENT
Start: 2019-11-18 | End: 2020-02-16

## 2019-11-18 ASSESSMENT — ENCOUNTER SYMPTOMS
NEUROLOGICAL NEGATIVE: 1
EYES NEGATIVE: 1
PSYCHIATRIC NEGATIVE: 1
MUSCULOSKELETAL NEGATIVE: 1
RESPIRATORY NEGATIVE: 1
GASTROINTESTINAL NEGATIVE: 1
CARDIOVASCULAR NEGATIVE: 1
CONSTITUTIONAL NEGATIVE: 1

## 2019-11-18 NOTE — PROGRESS NOTES
Subjective:      Patricio Lundberg is a 34 y.o. male who presents with Hyperlipidemia (lab review)        HPI    The patient is here for followup of chronic medical problems listed below. The patient is compliant with medications and having no side effects from them. Denies chest pain, abdominal pain, dyspnea, myalgias, or cough.    History of ADD. During previous visit, the patient was prescribed Modafinil 100 mg. He states 100 mg does not improve his energy. He states he has increased dose to 200 mg which has improved his symptoms and helped him study.     Patient continues to smoke. He states he is attempting to quit, and has tried nicotine supplements.     History of mixed hyperlipidemia, mild. Not currently on any cholesterol medications. Lipid panel on 11/14/19, reveals total cholesterol 192, Triglycerides 186, HDL 34, and . Patient had US-abdomen on 11/14/19, and wishes to discuss results. Patient continues daily regimen of diet and exercise.        Patient Active Problem List   Diagnosis   • Obesity (BMI 30-39.9)   • Tobacco dependence   • Mixed hyperlipidemia- mild no meds   • Elevated liver enzymes- borderline; needs angela 3 mos; prob fatty liver   • Excessive daytime sleepiness   • Low energy   • Attention deficit disorder (ADD) without hyperactivity       Outpatient Medications Prior to Visit   Medication Sig Dispense Refill   • modafinil (PROVIGIL) 100 MG Tab Take 1 Tab by mouth every day for 90 days. 90 Tab 0     No facility-administered medications prior to visit.         Allergies   Allergen Reactions   • Toradol Swelling     Per patient he felt like his throat was swelling shut.  After 10 minutes the sensation resolved       Review of Systems   Constitutional: Negative.    HENT: Negative.    Eyes: Negative.    Respiratory: Negative.    Cardiovascular: Negative.    Gastrointestinal: Negative.    Genitourinary: Negative.    Musculoskeletal: Negative.    Skin: Negative.    Neurological:  "Negative.    Endo/Heme/Allergies: Negative.    Psychiatric/Behavioral: Negative.    All other systems reviewed and are negative.           Objective:     /80 (BP Location: Left arm, Patient Position: Sitting, BP Cuff Size: Adult)   Pulse 77   Temp 37.1 °C (98.7 °F) (Temporal)   Ht 1.778 m (5' 10\")   Wt 101.7 kg (224 lb 3.2 oz)   SpO2 95%   BMI 32.17 kg/m²     Physical Exam   Constitutional: Oriented to person, place, and time. Appears well-developed and well-nourished. No distress.   Head: Normocephalic and atraumatic.   Right Ear: External ear normal.   Left Ear: External ear normal.   Nose: Nose normal.   Mouth/Throat: Oropharynx is clear and moist. No oropharyngeal exudate.   Eyes: Pupils are equal, round, and reactive to light. Conjunctivae and EOM are normal. Right eye exhibits no discharge. Left eye exhibits no discharge. No scleral icterus.   Neck: Normal range of motion. Neck supple. No JVD present. No tracheal deviation present. No thyromegaly present.   Cardiovascular: Normal rate, regular rhythm, normal heart sounds and intact distal pulses.  Exam reveals no gallop and no friction rub.    No murmur heard.  Pulmonary/Chest: Effort normal. No stridor. No respiratory distress. No wheezing or rales. No tenderness.   Abdominal: Soft. Bowel sounds are normal. No distension and no mass. There is no tenderness. There is no rebound and no guarding. No hernia.   Musculoskeletal: Normal range of motion No edema or tenderness.   Lymphadenopathy: No cervical adenopathy.   Neurological: Alert and oriented to person, place, and time. Normal reflexes. Normal reflexes. No cranial nerve deficit. Normal muscle tone. Coordination normal.   Skin: Skin is warm and dry. No rash noted. Not diaphoretic. No erythema. No pallor.   Psychiatric: Normal mood and affect. Behavior is normal. Judgment and thought content normal.   Nursing note and vitals reviewed.      No results found for: HBA1C  Lab Results   Component " Value Date/Time    SODIUM 139 11/14/2019 09:06 AM    POTASSIUM 4.4 11/14/2019 09:06 AM    CHLORIDE 106 11/14/2019 09:06 AM    CO2 24 11/14/2019 09:06 AM    GLUCOSE 86 11/14/2019 09:06 AM    BUN 16 11/14/2019 09:06 AM    CREATININE 0.97 11/14/2019 09:06 AM    CREATININE 1.0 09/27/2008 12:30 PM    ALKPHOSPHAT 65 11/14/2019 09:06 AM    ASTSGOT 24 11/14/2019 09:06 AM    ALTSGPT 39 11/14/2019 09:06 AM    TBILIRUBIN 0.5 11/14/2019 09:06 AM     No results found for: INR  Lab Results   Component Value Date/Time    CHOLSTRLTOT 192 11/14/2019 09:06 AM     (H) 11/14/2019 09:06 AM    HDL 34 (A) 11/14/2019 09:06 AM    TRIGLYCERIDE 186 (H) 11/14/2019 09:06 AM       Lab Results   Component Value Date/Time    TESTOSTERONE 293 (L) 11/14/2019 09:06 AM     No results found for: TSH  No results found for: FREET4  No results found for: URICACID  No components found for: VITB12  No results found for: 25HYDROXY       Assessment/Plan:       1. Low energy- rx provigil-much improved but not well controlled on only 100 mg a day.  2. Excessive daytime sleepiness- rx provigil-as above  3. Attention deficit disorder (ADD) without hyperactivity- rx provigil-as above  Patient was advised to increase Modafinil to 200 mg QD. Return in 3 months for reeval.  - modafinil (PROVIGIL) 100 MG Tab; Take 2 Tabs by mouth every day for 90 days.  Dispense: 180 Tab; Refill: 1      4. Mixed hyperlipidemia- mild no meds-under good control.  Continue same regimen.  5. Obesity (BMI 30-39.9)-diet and exercise with 15 pound weight reduction recommended.  High-protein low-carb diet discussed.    6.  Elevated liver enzymes.  Secondary to fatty liver as seen on abdominal ultrasound..  Patient advised on diet and exercise weight reduction.  Ultrasound of abdomen was discussed. Results showed no hydronephrosis. Possible calcification in kidneys, and mildly increased liver echogenicity could indicate hepatic steatosis or cirrhosis.  diet/exercise/lose 15 lbs.;  patient counseled    IMaryan (Scribaiden), am scribing for, and in the presence of, Patricio Zarate M.D..    Electronically signed by: Maryan Donald (Sravani), 11/18/2019    I, Patricio Zarate M.D., personally performed the services described in this documentation, as scribed by Maryan Donald in my presence, and it is both accurate and complete.

## 2020-02-13 ENCOUNTER — OFFICE VISIT (OUTPATIENT)
Dept: URGENT CARE | Facility: CLINIC | Age: 35
End: 2020-02-13
Payer: COMMERCIAL

## 2020-02-13 VITALS
TEMPERATURE: 99.7 F | DIASTOLIC BLOOD PRESSURE: 64 MMHG | WEIGHT: 224 LBS | RESPIRATION RATE: 16 BRPM | HEART RATE: 95 BPM | BODY MASS INDEX: 32.07 KG/M2 | OXYGEN SATURATION: 99 % | HEIGHT: 70 IN | SYSTOLIC BLOOD PRESSURE: 108 MMHG

## 2020-02-13 DIAGNOSIS — F17.200 TOBACCO DEPENDENCE: ICD-10-CM

## 2020-02-13 DIAGNOSIS — R05.9 COUGH: ICD-10-CM

## 2020-02-13 DIAGNOSIS — J45.901 ACUTE EXACERBATION OF EXTRINSIC ASTHMA: ICD-10-CM

## 2020-02-13 DIAGNOSIS — R68.89 FLU-LIKE SYMPTOMS: ICD-10-CM

## 2020-02-13 LAB
FLUAV+FLUBV AG SPEC QL IA: NEGATIVE
INT CON NEG: NORMAL
INT CON POS: NORMAL

## 2020-02-13 PROCEDURE — 99406 BEHAV CHNG SMOKING 3-10 MIN: CPT | Mod: 25 | Performed by: PHYSICIAN ASSISTANT

## 2020-02-13 PROCEDURE — 87804 INFLUENZA ASSAY W/OPTIC: CPT | Performed by: PHYSICIAN ASSISTANT

## 2020-02-13 PROCEDURE — 99214 OFFICE O/P EST MOD 30 MIN: CPT | Mod: 25 | Performed by: PHYSICIAN ASSISTANT

## 2020-02-13 RX ORDER — OSELTAMIVIR PHOSPHATE 75 MG/1
75 CAPSULE ORAL 2 TIMES DAILY
Qty: 10 CAP | Refills: 0 | Status: SHIPPED | OUTPATIENT
Start: 2020-02-13 | End: 2020-02-18

## 2020-02-13 RX ORDER — CODEINE PHOSPHATE AND GUAIFENESIN 10; 100 MG/5ML; MG/5ML
5 SOLUTION ORAL EVERY 6 HOURS PRN
Qty: 100 ML | Refills: 0 | Status: SHIPPED | OUTPATIENT
Start: 2020-02-13 | End: 2020-02-18

## 2020-02-13 RX ORDER — BENZONATATE 100 MG/1
200 CAPSULE ORAL 3 TIMES DAILY PRN
Qty: 60 CAP | Refills: 0 | Status: SHIPPED | OUTPATIENT
Start: 2020-02-13 | End: 2021-08-08

## 2020-02-13 RX ORDER — ALBUTEROL SULFATE 90 UG/1
2 AEROSOL, METERED RESPIRATORY (INHALATION) EVERY 6 HOURS PRN
Qty: 8.5 G | Refills: 0 | Status: SHIPPED | OUTPATIENT
Start: 2020-02-13 | End: 2021-08-08

## 2020-02-13 ASSESSMENT — ENCOUNTER SYMPTOMS
MYALGIAS: 1
COUGH: 1
CHILLS: 1
SORE THROAT: 1
HEADACHES: 1
FEVER: 1

## 2020-02-13 NOTE — LETTER
February 13, 2020         Patient: Patricio Lundberg   YOB: 1985   Date of Visit: 2/13/2020           To Whom it May Concern:    Patricio Lundberg was seen in my clinic on 2/13/2020. He may return to work on 2/16/2020..    If you have any questions or concerns, please don't hesitate to call.        Sincerely,           Britni Rodas P.A.-C.  Electronically Signed

## 2020-02-13 NOTE — PROGRESS NOTES
"Subjective:   Patricio Lundberg  is a 34 y.o. male who presents for Flu Like Symptoms    This is a new problem.  Patient presents urgent care with 2-1/2 to 3-day history of fever, chills, generalized body aches, nasal congestion, cough.  Family members have been ill with similar illness.  Patient did receive influenza vaccine this season.  Patient does have history of wheezing with illnesses.  He is not been using his rescue inhaler for this problem.  Patient has been taking Tylenol and Tessalon which is only minimally helpful.      HPI  Review of Systems   Constitutional: Positive for chills, fever and malaise/fatigue.   HENT: Positive for congestion and sore throat.    Respiratory: Positive for cough.    Musculoskeletal: Positive for myalgias.   Neurological: Positive for headaches.   All other systems reviewed and are negative.    Allergies   Allergen Reactions   • Toradol Swelling     Per patient he felt like his throat was swelling shut.  After 10 minutes the sensation resolved     Reviewed past medical, surgical , social and family history.  Reviewed prescription and over-the-counter medications with patient and electronic health record today.     Objective:   /64 (BP Location: Left arm, Patient Position: Sitting, BP Cuff Size: Large adult)   Pulse 95   Temp 37.6 °C (99.7 °F) (Temporal)   Resp 16   Ht 1.778 m (5' 10\")   Wt 101.6 kg (224 lb)   SpO2 99%   BMI 32.14 kg/m²   Physical Exam  Vitals signs reviewed.   Constitutional:       Appearance: He is well-developed. He is ill-appearing. He is not toxic-appearing.   HENT:      Head: Normocephalic and atraumatic.      Right Ear: Tympanic membrane, ear canal and external ear normal.      Left Ear: Tympanic membrane, ear canal and external ear normal.      Nose: Mucosal edema, congestion and rhinorrhea present.      Right Turbinates: Swollen.      Left Turbinates: Swollen.      Mouth/Throat:      Lips: Pink.      Mouth: Mucous membranes are moist. "      Pharynx: Uvula midline. Posterior oropharyngeal erythema present. No oropharyngeal exudate.      Tonsils: No tonsillar exudate. 1+ on the right. 1+ on the left.   Eyes:      Conjunctiva/sclera: Conjunctivae normal.      Pupils: Pupils are equal, round, and reactive to light.   Neck:      Musculoskeletal: Normal range of motion and neck supple.   Cardiovascular:      Rate and Rhythm: Normal rate and regular rhythm.      Heart sounds: Normal heart sounds. No murmur. No friction rub.   Pulmonary:      Effort: Pulmonary effort is normal. No respiratory distress.      Breath sounds: Examination of the right-upper field reveals wheezing. Examination of the left-upper field reveals wheezing. Examination of the right-middle field reveals wheezing. Examination of the left-middle field reveals wheezing. Examination of the right-lower field reveals wheezing. Examination of the left-lower field reveals wheezing. Wheezing present. No decreased breath sounds, rhonchi or rales.   Abdominal:      General: Bowel sounds are normal.      Palpations: Abdomen is soft.      Tenderness: There is no abdominal tenderness.   Musculoskeletal: Normal range of motion.   Lymphadenopathy:      Head:      Right side of head: No submental, submandibular or tonsillar adenopathy.      Left side of head: No submental, submandibular or tonsillar adenopathy.      Cervical: No cervical adenopathy.      Upper Body:      Right upper body: No supraclavicular adenopathy.      Left upper body: No supraclavicular adenopathy.   Skin:     General: Skin is warm and dry.      Findings: No rash.   Neurological:      Mental Status: He is alert and oriented to person, place, and time.      Cranial Nerves: Cranial nerves are intact. No cranial nerve deficit.      Sensory: Sensation is intact. No sensory deficit.      Motor: Motor function is intact.      Coordination: Coordination is intact. Coordination normal.      Gait: Gait is intact.   Psychiatric:          Attention and Perception: Attention normal.         Mood and Affect: Mood normal.         Speech: Speech normal.         Behavior: Behavior normal.         Thought Content: Thought content normal.         Judgment: Judgment normal.           Assessment/Plan:   1. Flu-like symptoms  - POCT Influenza A/B  - oseltamivir (TAMIFLU) 75 MG Cap; Take 1 Cap by mouth 2 times a day for 5 days.  Dispense: 10 Cap; Refill: 0    2. Acute exacerbation of extrinsic asthma  - albuterol 108 (90 Base) MCG/ACT Aero Soln inhalation aerosol; Inhale 2 Puffs by mouth every 6 hours as needed for Shortness of Breath.  Dispense: 8.5 g; Refill: 0    3. Tobacco dependence  Greater than 3 minutes spent counseling on nicotine dependence, associated disease process and affect on present illness. Counseled regarding cessation. Not ready to quit at this time.     4. Cough  - benzonatate (TESSALON) 100 MG Cap; Take 2 Caps by mouth 3 times a day as needed.  Dispense: 60 Cap; Refill: 0  - guaifenesin-codeine (ROBITUSSIN AC) Solution oral solution; Take 5 mL by mouth every 6 hours as needed for up to 5 days.  Dispense: 100 mL; Refill: 0      Testing for influenza is negative.  I believe this is a false negative given the presentation and physical exam findings of the patient.  Patient will be treated with Tamiflu.  He is given albuterol, Tessalon Perles for daytime cough and a small supply of codeine cough medicine for nighttime cough.  Patient is counseled on not driving while taking this medication as it can cause drowsiness.  Counseled patient on nicotine avoidance, see counseling above.  Nasal saline rinse  Flonase nasal spray  Mucinex  May use Tylenol or ibuprofen over-the-counter as needed for pain or fever not to exceed recommended daily dose.  Increase fluids, rest.  Patient may use salt water gargles, ice pops, cool fluids.        Differential diagnosis, natural history, supportive care, and indications for immediate follow-up discussed.     Red  flag warning symptoms and strict ER/follow-up precautions given.  The patient demonstrated a good understanding and agreed with the treatment plan.  Please note that this note was created using voice recognition speech to text software. Every effort has been made to correct obvious errors.  However, I expect there are errors of grammar and possibly context that were not discovered prior to finalizing the note  EUGENIO Rodas PA-C

## 2020-02-27 ENCOUNTER — EH NON-PROVIDER (OUTPATIENT)
Dept: OCCUPATIONAL MEDICINE | Facility: CLINIC | Age: 35
End: 2020-02-27

## 2020-02-27 DIAGNOSIS — Z23 IMMUNIZATION DUE: ICD-10-CM

## 2020-02-27 PROCEDURE — 90746 HEPB VACCINE 3 DOSE ADULT IM: CPT | Performed by: NURSE PRACTITIONER

## 2020-09-21 ENCOUNTER — IMMUNIZATION (OUTPATIENT)
Dept: OCCUPATIONAL MEDICINE | Facility: CLINIC | Age: 35
End: 2020-09-21

## 2020-09-21 DIAGNOSIS — Z23 NEED FOR VACCINATION: ICD-10-CM

## 2020-09-21 PROCEDURE — 90686 IIV4 VACC NO PRSV 0.5 ML IM: CPT | Performed by: PREVENTIVE MEDICINE

## 2020-12-30 DIAGNOSIS — Z23 NEED FOR VACCINATION: ICD-10-CM

## 2021-08-08 ENCOUNTER — HOSPITAL ENCOUNTER (EMERGENCY)
Facility: MEDICAL CENTER | Age: 36
End: 2021-08-08
Attending: EMERGENCY MEDICINE
Payer: COMMERCIAL

## 2021-08-08 ENCOUNTER — APPOINTMENT (OUTPATIENT)
Dept: RADIOLOGY | Facility: MEDICAL CENTER | Age: 36
End: 2021-08-08
Attending: EMERGENCY MEDICINE
Payer: COMMERCIAL

## 2021-08-08 VITALS
HEART RATE: 67 BPM | TEMPERATURE: 96.8 F | RESPIRATION RATE: 18 BRPM | WEIGHT: 229.28 LBS | OXYGEN SATURATION: 94 % | SYSTOLIC BLOOD PRESSURE: 116 MMHG | DIASTOLIC BLOOD PRESSURE: 83 MMHG | BODY MASS INDEX: 32.82 KG/M2 | HEIGHT: 70 IN

## 2021-08-08 DIAGNOSIS — R07.9 CHEST PAIN, UNSPECIFIED TYPE: ICD-10-CM

## 2021-08-08 LAB
ALBUMIN SERPL BCP-MCNC: 4.4 G/DL (ref 3.2–4.9)
ALBUMIN/GLOB SERPL: 1.4 G/DL
ALP SERPL-CCNC: 84 U/L (ref 30–99)
ALT SERPL-CCNC: 58 U/L (ref 2–50)
ANION GAP SERPL CALC-SCNC: 11 MMOL/L (ref 7–16)
AST SERPL-CCNC: 31 U/L (ref 12–45)
BASOPHILS # BLD AUTO: 0.7 % (ref 0–1.8)
BASOPHILS # BLD: 0.08 K/UL (ref 0–0.12)
BILIRUB SERPL-MCNC: 0.5 MG/DL (ref 0.1–1.5)
BUN SERPL-MCNC: 18 MG/DL (ref 8–22)
CALCIUM SERPL-MCNC: 9.1 MG/DL (ref 8.4–10.2)
CHLORIDE SERPL-SCNC: 103 MMOL/L (ref 96–112)
CO2 SERPL-SCNC: 24 MMOL/L (ref 20–33)
CREAT SERPL-MCNC: 0.89 MG/DL (ref 0.5–1.4)
EKG IMPRESSION: NORMAL
EOSINOPHIL # BLD AUTO: 0.41 K/UL (ref 0–0.51)
EOSINOPHIL NFR BLD: 3.3 % (ref 0–6.9)
ERYTHROCYTE [DISTWIDTH] IN BLOOD BY AUTOMATED COUNT: 39.1 FL (ref 35.9–50)
GLOBULIN SER CALC-MCNC: 3.2 G/DL (ref 1.9–3.5)
GLUCOSE SERPL-MCNC: 82 MG/DL (ref 65–99)
HCT VFR BLD AUTO: 49.7 % (ref 42–52)
HGB BLD-MCNC: 16.8 G/DL (ref 14–18)
IMM GRANULOCYTES # BLD AUTO: 0.05 K/UL (ref 0–0.11)
IMM GRANULOCYTES NFR BLD AUTO: 0.4 % (ref 0–0.9)
LIPASE SERPL-CCNC: 54 U/L (ref 7–58)
LYMPHOCYTES # BLD AUTO: 3.81 K/UL (ref 1–4.8)
LYMPHOCYTES NFR BLD: 31.1 % (ref 22–41)
MCH RBC QN AUTO: 29.8 PG (ref 27–33)
MCHC RBC AUTO-ENTMCNC: 33.8 G/DL (ref 33.7–35.3)
MCV RBC AUTO: 88.1 FL (ref 81.4–97.8)
MONOCYTES # BLD AUTO: 1.11 K/UL (ref 0–0.85)
MONOCYTES NFR BLD AUTO: 9 % (ref 0–13.4)
NEUTROPHILS # BLD AUTO: 6.81 K/UL (ref 1.82–7.42)
NEUTROPHILS NFR BLD: 55.5 % (ref 44–72)
NRBC # BLD AUTO: 0 K/UL
NRBC BLD-RTO: 0 /100 WBC
PLATELET # BLD AUTO: 255 K/UL (ref 164–446)
PMV BLD AUTO: 10.1 FL (ref 9–12.9)
POTASSIUM SERPL-SCNC: 4.2 MMOL/L (ref 3.6–5.5)
PROT SERPL-MCNC: 7.6 G/DL (ref 6–8.2)
RBC # BLD AUTO: 5.64 M/UL (ref 4.7–6.1)
SODIUM SERPL-SCNC: 138 MMOL/L (ref 135–145)
TROPONIN T SERPL-MCNC: <6 NG/L (ref 6–19)
TROPONIN T SERPL-MCNC: <6 NG/L (ref 6–19)
WBC # BLD AUTO: 12.3 K/UL (ref 4.8–10.8)

## 2021-08-08 PROCEDURE — 700102 HCHG RX REV CODE 250 W/ 637 OVERRIDE(OP): Performed by: EMERGENCY MEDICINE

## 2021-08-08 PROCEDURE — 93005 ELECTROCARDIOGRAM TRACING: CPT | Performed by: EMERGENCY MEDICINE

## 2021-08-08 PROCEDURE — 80053 COMPREHEN METABOLIC PANEL: CPT

## 2021-08-08 PROCEDURE — 83690 ASSAY OF LIPASE: CPT

## 2021-08-08 PROCEDURE — 84484 ASSAY OF TROPONIN QUANT: CPT

## 2021-08-08 PROCEDURE — 93005 ELECTROCARDIOGRAM TRACING: CPT

## 2021-08-08 PROCEDURE — 85025 COMPLETE CBC W/AUTO DIFF WBC: CPT

## 2021-08-08 PROCEDURE — 96374 THER/PROPH/DIAG INJ IV PUSH: CPT

## 2021-08-08 PROCEDURE — 99284 EMERGENCY DEPT VISIT MOD MDM: CPT

## 2021-08-08 PROCEDURE — 71045 X-RAY EXAM CHEST 1 VIEW: CPT

## 2021-08-08 PROCEDURE — A9270 NON-COVERED ITEM OR SERVICE: HCPCS | Performed by: EMERGENCY MEDICINE

## 2021-08-08 PROCEDURE — 700111 HCHG RX REV CODE 636 W/ 250 OVERRIDE (IP): Performed by: EMERGENCY MEDICINE

## 2021-08-08 RX ORDER — NAPROXEN SODIUM 220 MG
440 TABLET ORAL PRN
Status: SHIPPED | COMMUNITY
End: 2021-08-11

## 2021-08-08 RX ORDER — MORPHINE SULFATE 4 MG/ML
INJECTION, SOLUTION INTRAMUSCULAR; INTRAVENOUS
Status: DISCONTINUED
Start: 2021-08-08 | End: 2021-08-08 | Stop reason: HOSPADM

## 2021-08-08 RX ORDER — OMEPRAZOLE 20 MG/1
20 CAPSULE, DELAYED RELEASE ORAL ONCE
Status: COMPLETED | OUTPATIENT
Start: 2021-08-08 | End: 2021-08-08

## 2021-08-08 RX ORDER — MORPHINE SULFATE 4 MG/ML
4 INJECTION, SOLUTION INTRAMUSCULAR; INTRAVENOUS ONCE
Status: COMPLETED | OUTPATIENT
Start: 2021-08-08 | End: 2021-08-08

## 2021-08-08 RX ORDER — FAMOTIDINE 20 MG/1
20 TABLET, FILM COATED ORAL 2 TIMES DAILY
Qty: 30 TABLET | Refills: 0 | Status: SHIPPED | OUTPATIENT
Start: 2021-08-08 | End: 2021-08-23

## 2021-08-08 RX ADMIN — MORPHINE SULFATE 4 MG: 4 INJECTION INTRAVENOUS at 14:22

## 2021-08-08 RX ADMIN — OMEPRAZOLE 20 MG: 20 CAPSULE, DELAYED RELEASE ORAL at 16:15

## 2021-08-08 ASSESSMENT — LIFESTYLE VARIABLES
HAVE YOU EVER FELT YOU SHOULD CUT DOWN ON YOUR DRINKING: NO
DO YOU DRINK ALCOHOL: YES

## 2021-08-08 ASSESSMENT — PAIN DESCRIPTION - DESCRIPTORS: DESCRIPTORS: ACHING

## 2021-08-08 ASSESSMENT — FIBROSIS 4 INDEX: FIB4 SCORE: 0.55

## 2021-08-08 NOTE — ED TRIAGE NOTES
"Presents accompanied by family.  Pt C/O acute onset of generalized abdominal pain, and central chest pain without dyspnea recurring \"for the past few minutes.\"  He denies any episodes of N/V/D.   Chief Complaint   Patient presents with   • Abdominal Pain   • Chest Pain     /85   Pulse 70   Temp 36.1 °C (97 °F) (Temporal)   Resp 20   Ht 1.778 m (5' 10\")   Wt 104 kg (229 lb 4.5 oz)   SpO2 95%   BMI 32.90 kg/m²      "

## 2021-08-08 NOTE — ED PROVIDER NOTES
ED Provider Note    CHIEF COMPLAINT  Chief Complaint   Patient presents with   • Abdominal Pain   • Chest Pain       HPI  Patricio Lundberg is a 35 y.o. male who presents with pain across the epigastrium started in the epigastric region.  It radiated to the right parasternal region is never had before there is a band type pain around as well.  He has a dull intense feeling with it is never had it like this.  Occasionally radiates to the back he has no nausea no vomiting no fever no chills no cough runny nose or sore throat has been having occasional jaw pain recently is on naproxen for the no diarrhea.  He smokes a pack per day no alcohol or drugs.  Family history is unknown for heart disease.  All other systems are negative    REVIEW OF SYSTEMS  See HPI for further details    PAST MEDICAL HISTORY  Past Medical History:   Diagnosis Date   • ASTHMA        FAMILY HISTORY  Family History   Problem Relation Age of Onset   • Cancer Father         brain        SOCIAL HISTORY  Social History     Socioeconomic History   • Marital status:      Spouse name: Not on file   • Number of children: Not on file   • Years of education: Not on file   • Highest education level: Not on file   Occupational History   • Not on file   Tobacco Use   • Smoking status: Current Every Day Smoker     Packs/day: 1.00     Types: Cigarettes   • Smokeless tobacco: Never Used   Vaping Use   • Vaping Use: Never used   Substance and Sexual Activity   • Alcohol use: No   • Drug use: No   • Sexual activity: Yes   Other Topics Concern   • Not on file   Social History Narrative   • Not on file     Social Determinants of Health     Financial Resource Strain:    • Difficulty of Paying Living Expenses:    Food Insecurity:    • Worried About Running Out of Food in the Last Year:    • Ran Out of Food in the Last Year:    Transportation Needs:    • Lack of Transportation (Medical):    • Lack of Transportation (Non-Medical):    Physical Activity:    •  "Days of Exercise per Week:    • Minutes of Exercise per Session:    Stress:    • Feeling of Stress :    Social Connections:    • Frequency of Communication with Friends and Family:    • Frequency of Social Gatherings with Friends and Family:    • Attends [employer] Services:    • Active Member of Clubs or Organizations:    • Attends Club or Organization Meetings:    • Marital Status:    Intimate Partner Violence:    • Fear of Current or Ex-Partner:    • Emotionally Abused:    • Physically Abused:    • Sexually Abused:        SURGICAL HISTORY  History reviewed. No pertinent surgical history.    CURRENT MEDICATIONS  Home Medications     Reviewed by Lynne Modi (Pharmacy Tech) on 08/08/21 at 1418  Med List Status: Complete   Medication Last Dose Status   naproxen (ALEVE) 220 MG tablet 8/7/2021 Active                ALLERGIES  Allergies   Allergen Reactions   • Toradol Swelling     Per patient he felt like his throat was swelling shut.  After 10 minutes the sensation resolved       PHYSICAL EXAM  VITAL SIGNS: /85   Pulse 70   Temp 36.1 °C (97 °F) (Temporal)   Resp 20   Ht 1.778 m (5' 10\")   Wt 104 kg (229 lb 4.5 oz)   SpO2 95%   BMI 32.90 kg/m²     Constitutional: Patient is alert and oriented x3 in alert and oriented to moderate distress distress   HENT: Moist mucous membranes  Eyes:   No conjunctivitis or icterus  Neck: Trach is midline no palpable thyroid  Lymphatic: Negative anterior cervical lymphadenopathy  Cardiovascular: Normal heart rate    Thorax & Lungs: Clear to auscultation  Back: No CVA tenderness  Abdomen: Soft nontender no right upper quadrant tenderness  Neurologic: Normal motor sensation  Extremities: No edema  Psychiatric: Affect normal, Judgment normal, Mood normal.     Results for orders placed or performed during the hospital encounter of 08/08/21   CBC with Differential   Result Value Ref Range    WBC 12.3 (H) 4.8 - 10.8 K/uL    RBC 5.64 4.70 - 6.10 M/uL    Hemoglobin 16.8 " 14.0 - 18.0 g/dL    Hematocrit 49.7 42.0 - 52.0 %    MCV 88.1 81.4 - 97.8 fL    MCH 29.8 27.0 - 33.0 pg    MCHC 33.8 33.7 - 35.3 g/dL    RDW 39.1 35.9 - 50.0 fL    Platelet Count 255 164 - 446 K/uL    MPV 10.1 9.0 - 12.9 fL    Neutrophils-Polys 55.50 44.00 - 72.00 %    Lymphocytes 31.10 22.00 - 41.00 %    Monocytes 9.00 0.00 - 13.40 %    Eosinophils 3.30 0.00 - 6.90 %    Basophils 0.70 0.00 - 1.80 %    Immature Granulocytes 0.40 0.00 - 0.90 %    Nucleated RBC 0.00 /100 WBC    Neutrophils (Absolute) 6.81 1.82 - 7.42 K/uL    Lymphs (Absolute) 3.81 1.00 - 4.80 K/uL    Monos (Absolute) 1.11 (H) 0.00 - 0.85 K/uL    Eos (Absolute) 0.41 0.00 - 0.51 K/uL    Baso (Absolute) 0.08 0.00 - 0.12 K/uL    Immature Granulocytes (abs) 0.05 0.00 - 0.11 K/uL    NRBC (Absolute) 0.00 K/uL   Complete Metabolic Panel (CMP)   Result Value Ref Range    Sodium 138 135 - 145 mmol/L    Potassium 4.2 3.6 - 5.5 mmol/L    Chloride 103 96 - 112 mmol/L    Co2 24 20 - 33 mmol/L    Anion Gap 11.0 7.0 - 16.0    Glucose 82 65 - 99 mg/dL    Bun 18 8 - 22 mg/dL    Creatinine 0.89 0.50 - 1.40 mg/dL    Calcium 9.1 8.4 - 10.2 mg/dL    AST(SGOT) 31 12 - 45 U/L    ALT(SGPT) 58 (H) 2 - 50 U/L    Alkaline Phosphatase 84 30 - 99 U/L    Total Bilirubin 0.5 0.1 - 1.5 mg/dL    Albumin 4.4 3.2 - 4.9 g/dL    Total Protein 7.6 6.0 - 8.2 g/dL    Globulin 3.2 1.9 - 3.5 g/dL    A-G Ratio 1.4 g/dL   Troponin   Result Value Ref Range    Troponin T <6 6 - 19 ng/L   ESTIMATED GFR   Result Value Ref Range    GFR If African American >60 >60 mL/min/1.73 m 2    GFR If Non African American >60 >60 mL/min/1.73 m 2   LIPASE   Result Value Ref Range    Lipase 54 7 - 58 U/L   EKG   Result Value Ref Range    Report       [facility] Emergency Dept.    Test Date:  2021  Pt Name:    CURTIS CASTRO                Department: EDS  MRN:        [id number]                      Room:  Gender:     Male                         Technician:   :        [id number]                    Requested By:ER TRIAGE PROTOCOL  Order #:    [id number]                    Reading MD: ANEL NIELSEN MD    Measurements  Intervals                                Axis  Rate:       71                           P:          47  CA:         149                          QRS:        21  QRSD:       88                           T:          24  QT:         386  QTc:        420    Interpretive Statements  Sinus rhythm  Probable left atrial enlargement  Compared to ECG 09/27/2008 13:38:49  ST (T wave) deviation no longer present  Electronically Signed On 8-8-2021 14:27:13 PDT by ANEL NIELSEN MD        DX-CHEST-PORTABLE (1 VIEW)   Final Result      Minimal linear atelectasis within the left lung base.            COURSE & MEDICAL DECISION MAKING  Pertinent Labs & Imaging studies reviewed. (See chart for details)  Patient presents with abdominal discomfort going into the chest.  He has not had a before.  His EKG is normal.  His initial troponin is nondetectable.  The pain started about 45 minutes prior.  Due to that we did do a delta troponin and again it was a nondetectable troponin ruling out the MI.  His labs showed normal complete metabolic panel lipase CMP.  He has improved.  I am discharging the patient with Pepcid given first dose in the ER is given return precautions and follow-up    FINAL IMPRESSION  1.    1. Chest pain, unspecified type         2.   3.         Electronically signed by: Anel Nielsen M.D., 8/8/2021 2:25 PM

## 2021-08-08 NOTE — ED NOTES
Med rec updated and complete  Allergies reviewed  Interviewed pt with wife at bedside with permission from pt.  Pt reports no prescription medications or vitamins   Pt reports no antibiotics in the last 30 days.      No current facility-administered medications on file prior to encounter.     Current Outpatient Medications on File Prior to Encounter   Medication Sig Dispense Refill   • naproxen (ALEVE) 220 MG tablet Take 440 mg by mouth as needed. Indications: Pain

## 2021-08-11 ENCOUNTER — OFFICE VISIT (OUTPATIENT)
Dept: MEDICAL GROUP | Age: 36
End: 2021-08-11
Payer: COMMERCIAL

## 2021-08-11 VITALS
WEIGHT: 232.4 LBS | OXYGEN SATURATION: 95 % | TEMPERATURE: 97.4 F | HEIGHT: 70 IN | HEART RATE: 82 BPM | BODY MASS INDEX: 33.27 KG/M2 | SYSTOLIC BLOOD PRESSURE: 116 MMHG | DIASTOLIC BLOOD PRESSURE: 80 MMHG

## 2021-08-11 DIAGNOSIS — E66.9 OBESITY (BMI 30.0-34.9): ICD-10-CM

## 2021-08-11 DIAGNOSIS — Z00.00 HEALTHCARE MAINTENANCE: ICD-10-CM

## 2021-08-11 DIAGNOSIS — E78.5 DYSLIPIDEMIA: ICD-10-CM

## 2021-08-11 DIAGNOSIS — R10.13 MIDEPIGASTRIC PAIN: ICD-10-CM

## 2021-08-11 DIAGNOSIS — F17.200 TOBACCO DEPENDENCE: ICD-10-CM

## 2021-08-11 PROCEDURE — 99214 OFFICE O/P EST MOD 30 MIN: CPT | Performed by: INTERNAL MEDICINE

## 2021-08-11 RX ORDER — BUPROPION HYDROCHLORIDE 150 MG/1
150 TABLET ORAL EVERY MORNING
Qty: 180 TABLET | Refills: 1 | Status: SHIPPED | OUTPATIENT
Start: 2021-08-11 | End: 2022-04-25

## 2021-08-11 RX ORDER — BUPROPION HYDROCHLORIDE 150 MG/1
150 TABLET, EXTENDED RELEASE ORAL 2 TIMES DAILY
Qty: 180 TABLET | Refills: 1 | Status: SHIPPED | OUTPATIENT
Start: 2021-08-11 | End: 2022-04-25

## 2021-08-11 ASSESSMENT — ENCOUNTER SYMPTOMS
PSYCHIATRIC NEGATIVE: 1
RESPIRATORY NEGATIVE: 1
EYES NEGATIVE: 1
CONSTITUTIONAL NEGATIVE: 1
MUSCULOSKELETAL NEGATIVE: 1
GASTROINTESTINAL NEGATIVE: 1
NEUROLOGICAL NEGATIVE: 1
CARDIOVASCULAR NEGATIVE: 1

## 2021-08-11 ASSESSMENT — FIBROSIS 4 INDEX: FIB4 SCORE: 0.59

## 2021-08-11 ASSESSMENT — PATIENT HEALTH QUESTIONNAIRE - PHQ9: CLINICAL INTERPRETATION OF PHQ2 SCORE: 0

## 2021-08-11 NOTE — PROGRESS NOTES
Subjective:      Patricio Lundberg is a 35 y.o. male who presents with Hospital Follow-up (8/8/21 Abdominal Pain), Nicotine Dependence, and Weight Check (would like to lose weight )  Patient is here for transitional care management for ER visit from 8/8/2021 when he experienced acute midepigastric abdominal pain rating to the chest.  MI was ruled out.  Is felt to be esophageal spasm or reflux referred here for follow-up.  Chest pain subsided.  Has been on Pepcid on a as needed basis was know if he has an ulcer.  He has no exertional chest pain PND orthopnea.  No peripheral edema.  Has not been exercising regularly and is concerned about his weight gain and resumption of smoking and wants to help with both.  Patient works as an RN at [employer] and is fully vaccinated against Covid.             HPI    Outpatient Medications Prior to Visit   Medication Sig Dispense Refill   • famotidine (PEPCID) 20 MG Tab Take 1 tablet by mouth 2 times a day for 15 days. 30 tablet 0   • naproxen (ALEVE) 220 MG tablet Take 440 mg by mouth as needed. Indications: Pain (Patient not taking: Reported on 8/11/2021)       No facility-administered medications prior to visit.     Allergies   Allergen Reactions   • Toradol Swelling     Per patient he felt like his throat was swelling shut.  After 10 minutes the sensation resolved     Patient Active Problem List    Diagnosis Date Noted   • Excessive daytime sleepiness 11/18/2019   • Low energy 11/18/2019   • Attention deficit disorder (ADD) without hyperactivity 11/18/2019   • Mixed hyperlipidemia- mild no meds 04/23/2019   • Elevated liver enzymes- borderline; needs angela 3 mos; prob fatty liver 04/23/2019   • Tobacco dependence 02/19/2019   • Obesity (BMI 30-39.9) 06/19/2018       Review of Systems   Constitutional: Negative.    HENT: Negative.    Eyes: Negative.    Respiratory: Negative.    Cardiovascular: Negative.    Gastrointestinal: Negative.    Genitourinary: Negative.   "  Musculoskeletal: Negative.    Skin: Negative.    Neurological: Negative.    Endo/Heme/Allergies: Negative.    Psychiatric/Behavioral: Negative.           Objective:     /80 (BP Location: Left arm, Patient Position: Sitting, BP Cuff Size: Adult)   Pulse 82   Temp 36.3 °C (97.4 °F) (Temporal)   Ht 1.778 m (5' 10\")   Wt 105 kg (232 lb 6.4 oz)   SpO2 95%   BMI 33.35 kg/m²      Physical Exam  Constitutional:       General: He is not in acute distress.     Appearance: He is well-developed. He is not diaphoretic.   HENT:      Head: Normocephalic and atraumatic.      Right Ear: External ear normal.      Left Ear: External ear normal.      Nose: Nose normal.      Mouth/Throat:      Pharynx: No oropharyngeal exudate.   Eyes:      General: No scleral icterus.        Right eye: No discharge.         Left eye: No discharge.      Conjunctiva/sclera: Conjunctivae normal.      Pupils: Pupils are equal, round, and reactive to light.   Neck:      Thyroid: No thyromegaly.      Vascular: No JVD.      Trachea: No tracheal deviation.   Cardiovascular:      Rate and Rhythm: Normal rate and regular rhythm.      Heart sounds: Normal heart sounds. No murmur heard.   No friction rub. No gallop.    Pulmonary:      Effort: Pulmonary effort is normal. No respiratory distress.      Breath sounds: Normal breath sounds. No stridor. No wheezing or rales.   Chest:      Chest wall: No tenderness.   Abdominal:      General: Bowel sounds are normal. There is no distension.      Palpations: Abdomen is soft. There is no mass.      Tenderness: There is no abdominal tenderness. There is no guarding or rebound.   Musculoskeletal:         General: No tenderness. Normal range of motion.      Cervical back: Normal range of motion and neck supple.   Lymphadenopathy:      Cervical: No cervical adenopathy.   Skin:     General: Skin is warm and dry.      Coloration: Skin is not pale.      Findings: No erythema or rash.   Neurological:      Mental " Status: He is alert and oriented to person, place, and time.      Motor: No abnormal muscle tone.      Coordination: Coordination normal.      Deep Tendon Reflexes: Reflexes are normal and symmetric. Reflexes normal.   Psychiatric:         Behavior: Behavior normal.         Thought Content: Thought content normal.         Judgment: Judgment normal.       .lll'  No results found for: HBA1C  Lab Results   Component Value Date/Time    SODIUM 138 08/08/2021 01:15 PM    POTASSIUM 4.2 08/08/2021 01:15 PM    CHLORIDE 103 08/08/2021 01:15 PM    CO2 24 08/08/2021 01:15 PM    GLUCOSE 82 08/08/2021 01:15 PM    BUN 18 08/08/2021 01:15 PM    CREATININE 0.89 08/08/2021 01:15 PM    CREATININE 1.0 09/27/2008 12:30 PM    ALKPHOSPHAT 84 08/08/2021 01:15 PM    ASTSGOT 31 08/08/2021 01:15 PM    ALTSGPT 58 (H) 08/08/2021 01:15 PM    TBILIRUBIN 0.5 08/08/2021 01:15 PM     No results found for: INR  Lab Results   Component Value Date/Time    CHOLSTRLTOT 192 11/14/2019 09:06 AM     (H) 11/14/2019 09:06 AM    HDL 34 (A) 11/14/2019 09:06 AM    TRIGLYCERIDE 186 (H) 11/14/2019 09:06 AM       Lab Results   Component Value Date/Time    TESTOSTERONE 293 (L) 11/14/2019 09:06 AM     No results found for: TSH  No results found for: FREET4  No results found for: URICACID  No components found for: VITB12  No results found for: 25HYDROXY;                   Assessment/Plan:        1. Midepigastric pain      - H.PYLORI STOOL ANTIGEN; Future    2. Healthcare maintenance     - TSH; Future  - Comp Metabolic Panel; Future  - Lipid Profile; Future  - CBC WITH DIFFERENTIAL; Future    3. Dyslipidem unknown control.  Check labs.  Mediterranean diet.  15 pound weight reduction recommended.     Counseled on diet and exercise.- TSH; Future  - Comp Metabolic Panel; Future  - Lipid Profile; Future  - CBC WITH DIFFERENTIAL; Future    4. Tobacco dependence        Patient counseled.  Start on bupropion which worked well for him in the past.  - - buPROPion SR  (WELLBUTRIN-SR) 150 MG TABLET SR 12 HR sustained-release tablet; Take 1 Tablet by mouth 2 times a day.  Dispense: 180 Tablet; Refill: 1    5. Obesity (BMI 30.0-34.9)   see above.   - - buPROPion SR (WELLBUTRIN-SR) 150 MG TABLET SR 12 HR sustained-release tablet; Take 1 Tablet by mouth 2 times a day.  Dispense: 180 Tablet; Refill: 1

## 2022-01-16 ENCOUNTER — HOSPITAL ENCOUNTER (OUTPATIENT)
Facility: MEDICAL CENTER | Age: 37
End: 2022-01-16
Attending: NURSE PRACTITIONER
Payer: COMMERCIAL

## 2022-01-16 ENCOUNTER — OFFICE VISIT (OUTPATIENT)
Dept: URGENT CARE | Facility: CLINIC | Age: 37
End: 2022-01-16
Payer: COMMERCIAL

## 2022-01-16 VITALS
TEMPERATURE: 97.9 F | RESPIRATION RATE: 20 BRPM | DIASTOLIC BLOOD PRESSURE: 74 MMHG | HEIGHT: 70 IN | HEART RATE: 98 BPM | SYSTOLIC BLOOD PRESSURE: 112 MMHG | WEIGHT: 233 LBS | BODY MASS INDEX: 33.36 KG/M2 | OXYGEN SATURATION: 95 %

## 2022-01-16 DIAGNOSIS — J02.9 SORETHROAT: ICD-10-CM

## 2022-01-16 DIAGNOSIS — R53.83 OTHER FATIGUE: ICD-10-CM

## 2022-01-16 DIAGNOSIS — R19.7 DIARRHEA IN ADULT PATIENT: ICD-10-CM

## 2022-01-16 DIAGNOSIS — M79.10 MYALGIA: ICD-10-CM

## 2022-01-16 DIAGNOSIS — Z20.822 CLOSE EXPOSURE TO COVID-19 VIRUS: ICD-10-CM

## 2022-01-16 DIAGNOSIS — R05.9 COUGH: ICD-10-CM

## 2022-01-16 PROCEDURE — 99213 OFFICE O/P EST LOW 20 MIN: CPT | Mod: CS | Performed by: NURSE PRACTITIONER

## 2022-01-16 PROCEDURE — U0003 INFECTIOUS AGENT DETECTION BY NUCLEIC ACID (DNA OR RNA); SEVERE ACUTE RESPIRATORY SYNDROME CORONAVIRUS 2 (SARS-COV-2) (CORONAVIRUS DISEASE [COVID-19]), AMPLIFIED PROBE TECHNIQUE, MAKING USE OF HIGH THROUGHPUT TECHNOLOGIES AS DESCRIBED BY CMS-2020-01-R: HCPCS

## 2022-01-16 PROCEDURE — U0005 INFEC AGEN DETEC AMPLI PROBE: HCPCS

## 2022-01-16 ASSESSMENT — ENCOUNTER SYMPTOMS
CARDIOVASCULAR NEGATIVE: 1
COUGH: 1
MYALGIAS: 1
DIARRHEA: 1
NEUROLOGICAL NEGATIVE: 1
EYES NEGATIVE: 1
SORE THROAT: 1
PSYCHIATRIC NEGATIVE: 1

## 2022-01-16 ASSESSMENT — FIBROSIS 4 INDEX: FIB4 SCORE: 0.61

## 2022-01-16 NOTE — LETTER
January 16, 2022        Patricio Mayesdamaris      Your employee/student was seen in our clinic today.  A concern for COVID-19 has been identified and testing is in progress.     We are asking you to excuse absences while following self-isolation protocol per Center for Disease Control (CDC) guidelines.  Your employee/student (and/or their parent) will be able to access test results through our electronic delivery system called [employer].     If the results of testing are negative, and once there has been no fever (temperature >100.4 F) for at least 72 hours without treatment, and no vomiting or diarrhea for at least 48 hours, then return to work/school is approved.    If the results of testing are positive then your employee/student will be contacted by the [facility] or [facility] for further instructions on duration of self-isolation and return to work protocol. In general, this will also follow the CDC guidelines     In general, repeat testing is not necessary and not offered through our [facility].     This is the only note that will be provided from [facility] for this visit.  Your employee/student will require an appointment with a primary care provider if FMLA or disability forms are required.    If you have any questions please do not hesitate to call me at the phone number listed below.    Sincerely,      Vivian Gonzales, APRN  [phone]  Electronically Signed

## 2022-01-16 NOTE — PROGRESS NOTES
"Subjective:   Patricio Lundberg is a 36 y.o. male who presents for Sore Throat (x2 days, ), Cough (today), and Diarrhea (today )       HPI  Pt presents for evaluation of a new problem, reports 2-day history of sore throat, fatigue, headache.  With new onset of cough, diarrhea, and myalgia.  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  He has taken over-the-counter cough and cold medications with some relief.  Patient is COVID vaccinated, is due for booster.    Review of Systems   Constitutional: Positive for malaise/fatigue.   HENT: Positive for congestion and sore throat.    Eyes: Negative.    Respiratory: Positive for cough.    Cardiovascular: Negative.    Gastrointestinal: Positive for diarrhea.   Genitourinary: Negative.    Musculoskeletal: Positive for myalgias.   Skin: Negative.    Neurological: Negative.    Psychiatric/Behavioral: Negative.    All other systems reviewed and are negative.      MEDS:   Current Outpatient Medications:   •  buPROPion (WELLBUTRIN XL) 150 MG XL tablet, Take 1 Tablet by mouth every morning. (Patient not taking: Reported on 1/16/2022), Disp: 180 Tablet, Rfl: 1  •  buPROPion SR (WELLBUTRIN-SR) 150 MG TABLET SR 12 HR sustained-release tablet, Take 1 Tablet by mouth 2 times a day. (Patient not taking: Reported on 1/16/2022), Disp: 180 Tablet, Rfl: 1  ALLERGIES:   Allergies   Allergen Reactions   • Toradol Swelling     Per patient he felt like his throat was swelling shut.  After 10 minutes the sensation resolved       Patient's PMH, SocHx, SurgHx, FamHx, Drug allergies and medications were reviewed.     Objective:   /74   Pulse 98   Temp 36.6 °C (97.9 °F) (Temporal)   Resp 20   Ht 1.778 m (5' 10\")   Wt 106 kg (233 lb)   SpO2 95%   BMI 33.43 kg/m²     Physical Exam  Vitals and nursing note reviewed.   Constitutional:       General: He is awake.      Appearance: Normal appearance. He is " well-developed and normal weight.   HENT:      Head: Normocephalic and atraumatic.      Right Ear: Tympanic membrane, ear canal and external ear normal.      Left Ear: Tympanic membrane, ear canal and external ear normal.      Nose: Nose normal.      Mouth/Throat:      Lips: Pink.      Mouth: Mucous membranes are moist.      Pharynx: Oropharynx is clear. Uvula midline. Posterior oropharyngeal erythema present.   Eyes:      Extraocular Movements: Extraocular movements intact.      Conjunctiva/sclera: Conjunctivae normal.      Pupils: Pupils are equal, round, and reactive to light.   Neck:      Thyroid: No thyromegaly.      Trachea: Trachea normal.   Cardiovascular:      Rate and Rhythm: Normal rate and regular rhythm.      Pulses: Normal pulses.      Heart sounds: Normal heart sounds, S1 normal and S2 normal.   Pulmonary:      Effort: Pulmonary effort is normal. No respiratory distress.      Breath sounds: Normal breath sounds. No wheezing, rhonchi or rales.   Abdominal:      General: Bowel sounds are normal.      Palpations: Abdomen is soft.   Musculoskeletal:         General: Normal range of motion.      Cervical back: Full passive range of motion without pain, normal range of motion and neck supple.   Lymphadenopathy:      Cervical: No cervical adenopathy.   Skin:     General: Skin is warm and dry.      Capillary Refill: Capillary refill takes less than 2 seconds.   Neurological:      General: No focal deficit present.      Mental Status: He is alert and oriented to person, place, and time.      Gait: Gait is intact.   Psychiatric:         Attention and Perception: Attention and perception normal.         Mood and Affect: Mood normal.         Speech: Speech normal.         Behavior: Behavior normal. Behavior is cooperative.         Thought Content: Thought content normal.         Judgment: Judgment normal.         Assessment/Plan:   Assessment      1. Close exposure to COVID-19 virus  - SARS-CoV-2 PCR (24 hour  In-House): Collect NP swab in VTM; Future    2. Sorethroat  - SARS-CoV-2 PCR (24 hour In-House): Collect NP swab in VTM; Future    3. Cough  - SARS-CoV-2 PCR (24 hour In-House): Collect NP swab in VTM; Future    4. Diarrhea in adult patient  - SARS-CoV-2 PCR (24 hour In-House): Collect NP swab in VTM; Future    5. Other fatigue  - SARS-CoV-2 PCR (24 hour In-House): Collect NP swab in VTM; Future    6. Myalgia  - SARS-CoV-2 PCR (24 hour In-House): Collect NP swab in VTM; Future    Vital signs stable at today's acute urgent care visit. Will obtain PCR COVID testing.  Advised to home isolate per CDC guidelines.  Supportive care options also discussed, to include alternating Tylenol and Advil, cough/cold/flu OTC medications for symptomatic relief, in addition to rest, fluids as tolerated. Differential diagnosis, natural history, and indications for immediate follow-up were discussed.     Advised the patient to follow-up with the primary care provider for recheck, reevaluation, and/or consideration of further management if necessary. Return to urgent care with any worsening symptoms or if there is no improvement in their current condition. Red flags discussed and indications to immediately call 911 or present to the Emergency Department.  All questions were encouraged and answered to the patient's satisfaction and understanding, and they agree to the plan of care.     I personally reviewed prior external notes and test results pertinent to today's visit.  I have independently reviewed and interpreted all diagnostics ordered during this urgent care acute visit. Time spent evaluating this patient was a greater than 30 minutes and includes preparing for visit, counseling/education, exam and evaluation, obtaining history, independent interpretation and ordering lab/test/procedures.      Please note that this dictation was created using voice recognition software. I have made a reasonable attempt to correct obvious errors,  but I expect that there are errors of grammar and possibly content that I did not discover before finalizing the note.

## 2022-01-17 DIAGNOSIS — R19.7 DIARRHEA IN ADULT PATIENT: ICD-10-CM

## 2022-01-17 DIAGNOSIS — Z20.822 CLOSE EXPOSURE TO COVID-19 VIRUS: ICD-10-CM

## 2022-01-17 DIAGNOSIS — M79.10 MYALGIA: ICD-10-CM

## 2022-01-17 DIAGNOSIS — J02.9 SORETHROAT: ICD-10-CM

## 2022-01-17 DIAGNOSIS — R05.9 COUGH: ICD-10-CM

## 2022-01-17 DIAGNOSIS — R53.83 OTHER FATIGUE: ICD-10-CM

## 2022-01-18 LAB
COVID ORDER STATUS COVID19: NORMAL
SARS-COV-2 RNA RESP QL NAA+PROBE: NOTDETECTED
SPECIMEN SOURCE: NORMAL

## 2022-04-25 ENCOUNTER — OFFICE VISIT (OUTPATIENT)
Dept: MEDICAL GROUP | Age: 37
End: 2022-04-25

## 2022-04-25 ENCOUNTER — HOSPITAL ENCOUNTER (OUTPATIENT)
Dept: LAB | Facility: MEDICAL CENTER | Age: 37
End: 2022-04-25
Attending: INTERNAL MEDICINE
Payer: COMMERCIAL

## 2022-04-25 VITALS
RESPIRATION RATE: 16 BRPM | DIASTOLIC BLOOD PRESSURE: 80 MMHG | TEMPERATURE: 97 F | HEIGHT: 70 IN | OXYGEN SATURATION: 96 % | SYSTOLIC BLOOD PRESSURE: 116 MMHG | WEIGHT: 232.6 LBS | BODY MASS INDEX: 33.3 KG/M2 | HEART RATE: 89 BPM

## 2022-04-25 DIAGNOSIS — E53.8 VITAMIN B 12 DEFICIENCY: ICD-10-CM

## 2022-04-25 DIAGNOSIS — R68.82 LOSS OF LIBIDO: ICD-10-CM

## 2022-04-25 DIAGNOSIS — E78.2 MIXED HYPERLIPIDEMIA: ICD-10-CM

## 2022-04-25 DIAGNOSIS — F17.200 TOBACCO DEPENDENCE: ICD-10-CM

## 2022-04-25 DIAGNOSIS — Z00.00 HEALTHCARE MAINTENANCE: ICD-10-CM

## 2022-04-25 DIAGNOSIS — R06.83 SNORING: ICD-10-CM

## 2022-04-25 DIAGNOSIS — E66.9 OBESITY (BMI 30.0-34.9): ICD-10-CM

## 2022-04-25 DIAGNOSIS — R53.83 OTHER FATIGUE: ICD-10-CM

## 2022-04-25 DIAGNOSIS — R74.8 ELEVATED LIVER ENZYMES: ICD-10-CM

## 2022-04-25 DIAGNOSIS — G47.33 OSA (OBSTRUCTIVE SLEEP APNEA): ICD-10-CM

## 2022-04-25 DIAGNOSIS — Z23 NEED FOR VACCINATION: ICD-10-CM

## 2022-04-25 DIAGNOSIS — G47.19 EXCESSIVE DAYTIME SLEEPINESS: ICD-10-CM

## 2022-04-25 LAB
25(OH)D3 SERPL-MCNC: 20 NG/ML (ref 30–100)
ALBUMIN SERPL BCP-MCNC: 4.6 G/DL (ref 3.2–4.9)
ALBUMIN/GLOB SERPL: 1.6 G/DL
ALP SERPL-CCNC: 79 U/L (ref 30–99)
ALT SERPL-CCNC: 56 U/L (ref 2–50)
ANION GAP SERPL CALC-SCNC: 9 MMOL/L (ref 7–16)
AST SERPL-CCNC: 21 U/L (ref 12–45)
BASOPHILS # BLD AUTO: 0.8 % (ref 0–1.8)
BASOPHILS # BLD: 0.09 K/UL (ref 0–0.12)
BILIRUB SERPL-MCNC: 0.3 MG/DL (ref 0.1–1.5)
BUN SERPL-MCNC: 14 MG/DL (ref 8–22)
CALCIUM SERPL-MCNC: 9.3 MG/DL (ref 8.5–10.5)
CHLORIDE SERPL-SCNC: 104 MMOL/L (ref 96–112)
CHOLEST SERPL-MCNC: 206 MG/DL (ref 100–199)
CO2 SERPL-SCNC: 25 MMOL/L (ref 20–33)
CREAT SERPL-MCNC: 0.99 MG/DL (ref 0.5–1.4)
EOSINOPHIL # BLD AUTO: 0.4 K/UL (ref 0–0.51)
EOSINOPHIL NFR BLD: 3.6 % (ref 0–6.9)
ERYTHROCYTE [DISTWIDTH] IN BLOOD BY AUTOMATED COUNT: 40.2 FL (ref 35.9–50)
FASTING STATUS PATIENT QL REPORTED: NORMAL
FOLATE SERPL-MCNC: 8.7 NG/ML
GFR SERPLBLD CREATININE-BSD FMLA CKD-EPI: 100 ML/MIN/1.73 M 2
GLOBULIN SER CALC-MCNC: 2.9 G/DL (ref 1.9–3.5)
GLUCOSE SERPL-MCNC: 98 MG/DL (ref 65–99)
HAV IGM SERPL QL IA: NORMAL
HBV CORE IGM SER QL: NORMAL
HBV SURFACE AG SER QL: NORMAL
HCT VFR BLD AUTO: 53.4 % (ref 42–52)
HCV AB SER QL: NORMAL
HDLC SERPL-MCNC: 36 MG/DL
HGB BLD-MCNC: 17.5 G/DL (ref 14–18)
IMM GRANULOCYTES # BLD AUTO: 0.05 K/UL (ref 0–0.11)
IMM GRANULOCYTES NFR BLD AUTO: 0.5 % (ref 0–0.9)
LDLC SERPL CALC-MCNC: 141 MG/DL
LYMPHOCYTES # BLD AUTO: 3.71 K/UL (ref 1–4.8)
LYMPHOCYTES NFR BLD: 33.7 % (ref 22–41)
MCH RBC QN AUTO: 29.5 PG (ref 27–33)
MCHC RBC AUTO-ENTMCNC: 32.8 G/DL (ref 33.7–35.3)
MCV RBC AUTO: 89.9 FL (ref 81.4–97.8)
MONOCYTES # BLD AUTO: 0.82 K/UL (ref 0–0.85)
MONOCYTES NFR BLD AUTO: 7.5 % (ref 0–13.4)
NEUTROPHILS # BLD AUTO: 5.93 K/UL (ref 1.82–7.42)
NEUTROPHILS NFR BLD: 53.9 % (ref 44–72)
NRBC # BLD AUTO: 0 K/UL
NRBC BLD-RTO: 0 /100 WBC
PLATELET # BLD AUTO: 270 K/UL (ref 164–446)
PMV BLD AUTO: 10.6 FL (ref 9–12.9)
POTASSIUM SERPL-SCNC: 4.4 MMOL/L (ref 3.6–5.5)
PROT SERPL-MCNC: 7.5 G/DL (ref 6–8.2)
PSA SERPL-MCNC: 0.74 NG/ML (ref 0–4)
RBC # BLD AUTO: 5.94 M/UL (ref 4.7–6.1)
SODIUM SERPL-SCNC: 138 MMOL/L (ref 135–145)
TRIGL SERPL-MCNC: 143 MG/DL (ref 0–149)
TSH SERPL DL<=0.005 MIU/L-ACNC: 1.67 UIU/ML (ref 0.38–5.33)
VIT B12 SERPL-MCNC: 1127 PG/ML (ref 211–911)
WBC # BLD AUTO: 11 K/UL (ref 4.8–10.8)

## 2022-04-25 PROCEDURE — 82746 ASSAY OF FOLIC ACID SERUM: CPT

## 2022-04-25 PROCEDURE — 84403 ASSAY OF TOTAL TESTOSTERONE: CPT

## 2022-04-25 PROCEDURE — 82306 VITAMIN D 25 HYDROXY: CPT

## 2022-04-25 PROCEDURE — 84153 ASSAY OF PSA TOTAL: CPT

## 2022-04-25 PROCEDURE — 82607 VITAMIN B-12: CPT

## 2022-04-25 PROCEDURE — 84270 ASSAY OF SEX HORMONE GLOBUL: CPT

## 2022-04-25 PROCEDURE — 80053 COMPREHEN METABOLIC PANEL: CPT

## 2022-04-25 PROCEDURE — 85025 COMPLETE CBC W/AUTO DIFF WBC: CPT

## 2022-04-25 PROCEDURE — 36415 COLL VENOUS BLD VENIPUNCTURE: CPT

## 2022-04-25 PROCEDURE — 90677 PCV20 VACCINE IM: CPT | Performed by: INTERNAL MEDICINE

## 2022-04-25 PROCEDURE — 80061 LIPID PANEL: CPT

## 2022-04-25 PROCEDURE — 90471 IMMUNIZATION ADMIN: CPT | Performed by: INTERNAL MEDICINE

## 2022-04-25 PROCEDURE — 80074 ACUTE HEPATITIS PANEL: CPT

## 2022-04-25 PROCEDURE — 84402 ASSAY OF FREE TESTOSTERONE: CPT

## 2022-04-25 PROCEDURE — 84443 ASSAY THYROID STIM HORMONE: CPT

## 2022-04-25 PROCEDURE — 99395 PREV VISIT EST AGE 18-39: CPT | Mod: 25 | Performed by: INTERNAL MEDICINE

## 2022-04-25 RX ORDER — VARENICLINE TARTRATE 1 MG/1
1 TABLET, FILM COATED ORAL 2 TIMES DAILY
Qty: 60 TABLET | Refills: 5 | Status: SHIPPED | OUTPATIENT
Start: 2022-05-25 | End: 2022-11-01

## 2022-04-25 RX ORDER — VARENICLINE TARTRATE 1 MG/1
1 TABLET, FILM COATED ORAL 2 TIMES DAILY
Qty: 60 TABLET | Refills: 5 | Status: SHIPPED
Start: 2022-05-25 | End: 2022-04-25 | Stop reason: SDUPTHER

## 2022-04-25 ASSESSMENT — FIBROSIS 4 INDEX: FIB4 SCORE: 0.61

## 2022-04-25 ASSESSMENT — ENCOUNTER SYMPTOMS
GASTROINTESTINAL NEGATIVE: 1
EYES NEGATIVE: 1
RESPIRATORY NEGATIVE: 1
NEUROLOGICAL NEGATIVE: 1
PSYCHIATRIC NEGATIVE: 1
CARDIOVASCULAR NEGATIVE: 1
CONSTITUTIONAL NEGATIVE: 1
MUSCULOSKELETAL NEGATIVE: 1

## 2022-04-25 ASSESSMENT — PATIENT HEALTH QUESTIONNAIRE - PHQ9: CLINICAL INTERPRETATION OF PHQ2 SCORE: 0

## 2022-04-26 NOTE — PROGRESS NOTES
Subjective     Patricio Lundberg is a 36 y.o. male who presents with Annual Exam (Spot on arm / sexual health )  and  The patient is here for followup of chronic medical problems listed below. The patient is compliant with medications and having no side effects from them. Denies chest pain, abdominal pain, dyspnea, myalgias, or cough.     Patient Active Problem List    Diagnosis Date Noted   • Excessive daytime sleepiness 11/18/2019   • Low energy 11/18/2019   • Attention deficit disorder (ADD) without hyperactivity 11/18/2019   • Mixed hyperlipidemia- mild no meds 04/23/2019   • Elevated liver enzymes- borderline; needs angela 3 mos; prob fatty liver 04/23/2019   • Tobacco dependence 02/19/2019   • Obesity (BMI 30-39.9) 06/19/2018     Allergies   Allergen Reactions   • Toradol Swelling     Per patient he felt like his throat was swelling shut.  After 10 minutes the sensation resolved     Outpatient Medications Prior to Visit   Medication Sig Dispense Refill   • buPROPion (WELLBUTRIN XL) 150 MG XL tablet Take 1 Tablet by mouth every morning. (Patient not taking: No sig reported) 180 Tablet 1   • buPROPion SR (WELLBUTRIN-SR) 150 MG TABLET SR 12 HR sustained-release tablet Take 1 Tablet by mouth 2 times a day. (Patient not taking: No sig reported) 180 Tablet 1     No facility-administered medications prior to visit.     No visits with results within 1 Month(s) from this visit.   Latest known visit with results is:   Hospital Outpatient Visit on 01/16/2022   Component Date Value   • SARS-CoV-2 Source 01/16/2022 Nasal Swab    • SARS-CoV-2 by PCR 01/16/2022 NotDetected    • COVID Order Status 01/16/2022 Received       Ajennie'            HPI    Review of Systems   Constitutional: Negative.    HENT: Negative.    Eyes: Negative.    Respiratory: Negative.    Cardiovascular: Negative.    Gastrointestinal: Negative.    Genitourinary: Negative.    Musculoskeletal: Negative.    Skin: Negative.    Neurological: Negative.   "  Endo/Heme/Allergies: Negative.    Psychiatric/Behavioral: Negative.               Objective     /80 (BP Location: Left arm, Patient Position: Sitting, BP Cuff Size: Large adult)   Pulse 89   Temp 36.1 °C (97 °F) (Temporal)   Resp 16   Ht 1.778 m (5' 10\")   Wt 106 kg (232 lb 9.6 oz)   SpO2 96%   BMI 33.37 kg/m²      Physical Exam  Skin:     Comments: She has benign nevus tan-colored smooth with smooth even borders measuring 2 mm on the dorsal aspect of his left mid forearm, and this is unchanged over the last year or 2.  He was reassured that this benign.  The lesion is flat and not raised bleeding or purplish.                  No visits with results within 1 Month(s) from this visit.   Latest known visit with results is:   Hospital Outpatient Visit on 01/16/2022   Component Date Value   • SARS-CoV-2 Source 01/16/2022 Nasal Swab    • SARS-CoV-2 by PCR 01/16/2022 NotDetected    • COVID Order Status 01/16/2022 Received       Ajennie'  No results found for: HBA1C  Lab Results   Component Value Date/Time    SODIUM 138 04/25/2022 08:26 AM    POTASSIUM 4.4 04/25/2022 08:26 AM    CHLORIDE 104 04/25/2022 08:26 AM    CO2 25 04/25/2022 08:26 AM    GLUCOSE 98 04/25/2022 08:26 AM    BUN 14 04/25/2022 08:26 AM    CREATININE 0.99 04/25/2022 08:26 AM    CREATININE 1.0 09/27/2008 12:30 PM    ALKPHOSPHAT 79 04/25/2022 08:26 AM    ASTSGOT 21 04/25/2022 08:26 AM    ALTSGPT 56 (H) 04/25/2022 08:26 AM    TBILIRUBIN 0.3 04/25/2022 08:26 AM     No results found for: INR  Lab Results   Component Value Date/Time    CHOLSTRLTOT 206 (H) 04/25/2022 08:26 AM     (H) 04/25/2022 08:26 AM    HDL 36 (A) 04/25/2022 08:26 AM    TRIGLYCERIDE 143 04/25/2022 08:26 AM       Lab Results   Component Value Date/Time    TESTOSTERONE 293 (L) 11/14/2019 09:06 AM     No results found for: TSH  No results found for: FREET4  No results found for: URICACID  No components found for: VITB12  Lab Results   Component Value Date/Time    25HYDROXY 20 " (L) 04/25/2022 08:26 AM     No results found for: HBA1C  Lab Results   Component Value Date/Time    SODIUM 138 04/25/2022 08:26 AM    POTASSIUM 4.4 04/25/2022 08:26 AM    CHLORIDE 104 04/25/2022 08:26 AM    CO2 25 04/25/2022 08:26 AM    GLUCOSE 98 04/25/2022 08:26 AM    BUN 14 04/25/2022 08:26 AM    CREATININE 0.99 04/25/2022 08:26 AM    CREATININE 1.0 09/27/2008 12:30 PM    ALKPHOSPHAT 79 04/25/2022 08:26 AM    ASTSGOT 21 04/25/2022 08:26 AM    ALTSGPT 56 (H) 04/25/2022 08:26 AM    TBILIRUBIN 0.3 04/25/2022 08:26 AM     No results found for: INR  Lab Results   Component Value Date/Time    CHOLSTRLTOT 206 (H) 04/25/2022 08:26 AM     (H) 04/25/2022 08:26 AM    HDL 36 (A) 04/25/2022 08:26 AM    TRIGLYCERIDE 143 04/25/2022 08:26 AM       Lab Results   Component Value Date/Time    TESTOSTERONE 293 (L) 11/14/2019 09:06 AM     No results found for: TSH  No results found for: FREET4  No results found for: URICACID  No components found for: VITB12  Lab Results   Component Value Date/Time    25HYDROXY 20 (L) 04/25/2022 08:26 AM   '               Assessment & Plan        1. Healthcare maintenance     - TSH; Future  - Comp Metabolic Panel; Future  - Lipid Profile; Future  - CBC WITH DIFFERENTIAL; Future  - VITAMIN D,25 HYDROXY; Future  - VITAMIN B12; Future  - FOLATE; Future  - TESTOSTERONE, FREE AND TOTAL; Future  - PROSTATE SPECIFIC AG DIAGNOSTIC; Future    2. Mixed hyperlipidemia- mild no meds    diet/exercise/lose 15 lbs.; patient counseled    - TSH; Future  - Comp Metabolic Panel; Future  - Lipid Profile; Future  - CBC WITH DIFFERENTIAL; Future  - VITAMIN D,25 HYDROXY; Future  - VITAMIN B12; Future  - FOLATE; Future  - TESTOSTERONE, FREE AND TOTAL; Future  - PROSTATE SPECIFIC AG DIAGNOSTIC; Future    3. Elevated liver enzymes- borderline; needs angela 3 mos; prob fatty liver     - TSH; Future  - Comp Metabolic Panel; Future  - Lipid Profile; Future  - CBC WITH DIFFERENTIAL; Future  - VITAMIN D,25 HYDROXY; Future  -  VITAMIN B12; Future  - FOLATE; Future  - TESTOSTERONE, FREE AND TOTAL; Future  - PROSTATE SPECIFIC AG DIAGNOSTIC; Future  - HEPATITIS PANEL ACUTE(4 COMPONENTS); Future    4. Need for vaccination     - Pneumococcal Conjugate Vaccine 20-Valent (19 yrs+)    5. Loss of libido     - TESTOSTERONE, FREE AND TOTAL; Future  - PROSTATE SPECIFIC AG DIAGNOSTIC; Future    6. Other fatigue     - CBC WITH DIFFERENTIAL; Future  - VITAMIN D,25 HYDROXY; Future  - VITAMIN B12; Future  - FOLATE; Future  - TESTOSTERONE, FREE AND TOTAL; Future    7. Tobacco dependence  Patient failed Wellbutrin.  We will give him a trial of Chantix.  Counseling given  Patient counseled. Encouraged to quit tobacco products. NicoDerm prescribed.    - varenicline (CHANTIX LORENA) 0.5 MG X 11 & 1 MG X 42 tablet; As directed  Dispense: 42 Tablet; Refill: 0  - varenicline (CHANTIX) 1 MG tablet; Take 1 Tablet by mouth 2 times a day.  Dispense: 60 Tablet; Refill: 5    8. Obesity (BMI 30.0-34.9)      diet/exercise/lose 15 lbs.; patient counseled';[  9. Snoring-highly suspicious for sleep apnea.  Refer to pulmonary medicine for sleep study      - Referral to Pulmonary and Sleep Medicine    10. Vitamin B 12 deficiency      - VITAMIN B12; Future  - FOLATE; Future    11. Excessive daytime sleepiness     - Referral to Pulmonary and Sleep Medicine    12. MARVEL (obstructive sleep apnea)        - Referral to Pulmonary and Sleep Medicine

## 2022-04-27 LAB
SHBG SERPL-SCNC: 21 NMOL/L (ref 17–56)
TESTOST FREE MFR SERPL: 2.2 % (ref 1.6–2.9)
TESTOST FREE SERPL-MCNC: 75 PG/ML (ref 47–244)
TESTOST SERPL-MCNC: 333 NG/DL (ref 300–1080)

## 2022-04-28 DIAGNOSIS — E29.1 HYPOGONADISM MALE: ICD-10-CM

## 2022-04-28 DIAGNOSIS — R74.8 ELEVATED LIVER ENZYMES: ICD-10-CM

## 2022-04-28 DIAGNOSIS — E55.9 VITAMIN D DEFICIENCY: ICD-10-CM

## 2022-04-28 DIAGNOSIS — E78.2 MIXED HYPERLIPIDEMIA: ICD-10-CM

## 2022-04-28 RX ORDER — VITAMIN B COMPLEX
1000 TABLET ORAL DAILY
Qty: 100 TABLET | Refills: 4 | Status: SHIPPED | OUTPATIENT
Start: 2022-04-28 | End: 2022-11-01

## 2022-04-28 RX ORDER — TESTOSTERONE CYPIONATE 200 MG/ML
200 INJECTION, SOLUTION INTRAMUSCULAR
Qty: 6 ML | Refills: 1 | Status: SHIPPED
Start: 2022-04-28 | End: 2022-04-28 | Stop reason: SDUPTHER

## 2022-04-28 RX ORDER — TESTOSTERONE CYPIONATE 200 MG/ML
200 INJECTION, SOLUTION INTRAMUSCULAR
Qty: 6 ML | Refills: 1 | Status: SHIPPED | OUTPATIENT
Start: 2022-04-28 | End: 2022-05-05 | Stop reason: SDUPTHER

## 2022-05-05 DIAGNOSIS — E29.1 HYPOGONADISM MALE: ICD-10-CM

## 2022-05-05 RX ORDER — TESTOSTERONE CYPIONATE 200 MG/ML
200 INJECTION, SOLUTION INTRAMUSCULAR
Qty: 6 ML | Refills: 1 | Status: SHIPPED | OUTPATIENT
Start: 2022-05-05 | End: 2022-07-28

## 2022-05-05 NOTE — TELEPHONE ENCOUNTER
VOICEMAIL  1. Caller Name: Patricio Lundberg                        Call Back Number: [phone] (home)       2. Message: pharmacy  Need a whole new script sent stating the total day supply required by law     3. Patient approves office to leave a detailed voicemail/MyChart message: no

## 2022-05-29 ENCOUNTER — OFFICE VISIT (OUTPATIENT)
Dept: URGENT CARE | Facility: CLINIC | Age: 37
End: 2022-05-29
Payer: COMMERCIAL

## 2022-05-29 VITALS
OXYGEN SATURATION: 94 % | HEART RATE: 93 BPM | RESPIRATION RATE: 14 BRPM | SYSTOLIC BLOOD PRESSURE: 118 MMHG | TEMPERATURE: 97.3 F | WEIGHT: 231 LBS | HEIGHT: 70 IN | BODY MASS INDEX: 33.07 KG/M2 | DIASTOLIC BLOOD PRESSURE: 76 MMHG

## 2022-05-29 DIAGNOSIS — J06.9 VIRAL URI WITH COUGH: ICD-10-CM

## 2022-05-29 LAB
FLUAV+FLUBV AG SPEC QL IA: NEGATIVE
INT CON NEG: NEGATIVE
INT CON POS: POSITIVE

## 2022-05-29 PROCEDURE — 99213 OFFICE O/P EST LOW 20 MIN: CPT | Performed by: NURSE PRACTITIONER

## 2022-05-29 PROCEDURE — 87804 INFLUENZA ASSAY W/OPTIC: CPT | Performed by: NURSE PRACTITIONER

## 2022-05-29 RX ORDER — VARENICLINE TARTRATE 0.5 MG/1
0.5 TABLET, FILM COATED ORAL 2 TIMES DAILY
COMMUNITY
Start: 2022-04-25 | End: 2022-11-01

## 2022-05-29 ASSESSMENT — VISUAL ACUITY: OU: 1

## 2022-05-29 ASSESSMENT — ENCOUNTER SYMPTOMS
COUGH: 1
MYALGIAS: 1
SHORTNESS OF BREATH: 0
CHILLS: 1
SORE THROAT: 0

## 2022-05-29 ASSESSMENT — FIBROSIS 4 INDEX: FIB4 SCORE: 0.39

## 2022-05-29 NOTE — PROGRESS NOTES
Subjective:     Patricio Lundberg is a 36 y.o. male who presents for Cough (X 5 days with congestion, fatigue, bodyaches, chills and felt head.  Home Covid swab Negative. Covid vaccinated no booster.)       Cough  This is a new problem. Episode onset: 5 days. Associated symptoms include chills and myalgias. Pertinent negatives include no sore throat or shortness of breath.     Symptoms slowly improving at this time.  Using DayQuil/NyQuil.    Did home COVID test yesterday and results were negative.  Fully vaccinated.  Denies concern for COVID.    Missed work for the past 3 days.  Needs note.    Patient was screened prior to rooming and denied COVID-19 diagnosis or contact with a person who has been diagnosed or is suspected to have COVID-19. During this visit, appropriate PPE was worn, hand hygiene was performed, and the patient and any visitors were masked.     PMH:  has a past medical history of ASTHMA.    MEDS:   Current Outpatient Medications:   •  testosterone cypionate (DEPO-TESTOSTERONE) 200 MG/ML Solution injection, Inject 1 mL into the shoulder, thigh, or buttocks every 14 days for 84 days. Please include 3 cc syringes and 18-gauge needles for drawing up medication, and 22-gauge needles for administration.  #6 of each., Disp: 6 mL, Rfl: 1  •  vitamin D3 (CHOLECALCIFEROL) 1000 Unit (25 mcg) Tab, Take 1 Tablet by mouth every day., Disp: 100 Tablet, Rfl: 4  •  varenicline (CHANTIX) 1 MG tablet, Take 1 Tablet by mouth 2 times a day., Disp: 60 Tablet, Rfl: 5  •  varenicline (CHANTIX LORENA) 0.5 MG X 11 & 1 MG X 42 tablet, As directed, Disp: 42 Tablet, Rfl: 0    ALLERGIES:   Allergies   Allergen Reactions   • Toradol Swelling     Per patient he felt like his throat was swelling shut.  After 10 minutes the sensation resolved     SURGHX: History reviewed. No pertinent surgical history.    SOCHX:  reports that he has been smoking cigarettes. He has been smoking about 1.00 pack per day. He has never used smokeless  "tobacco. He reports current alcohol use. He reports that he does not use drugs.     FH: Reviewed with patient, not pertinent to this visit.    Review of Systems   Constitutional: Positive for chills and malaise/fatigue.   HENT: Positive for congestion. Negative for sore throat.    Respiratory: Positive for cough. Negative for shortness of breath.    Musculoskeletal: Positive for myalgias.   All other systems reviewed and are negative.    Additional details per HPI.      Objective:     /76   Pulse 93   Temp 36.3 °C (97.3 °F) (Temporal)   Resp 14   Ht 1.778 m (5' 10\")   Wt 105 kg (231 lb)   SpO2 94%   BMI 33.15 kg/m²     Physical Exam  Vitals reviewed.   Constitutional:       General: He is not in acute distress.     Appearance: He is well-developed. He is ill-appearing. He is not toxic-appearing.   HENT:      Nose: Congestion present.      Mouth/Throat:      Mouth: Mucous membranes are moist.      Pharynx: Oropharynx is clear.   Eyes:      General: Vision grossly intact.      Extraocular Movements: Extraocular movements intact.   Cardiovascular:      Rate and Rhythm: Normal rate and regular rhythm.      Heart sounds: Normal heart sounds.   Pulmonary:      Effort: Pulmonary effort is normal. No respiratory distress.      Breath sounds: Normal breath sounds. No decreased breath sounds, wheezing, rhonchi or rales.   Musculoskeletal:         General: No deformity. Normal range of motion.      Cervical back: Normal range of motion.   Skin:     General: Skin is warm and dry.      Coloration: Skin is not pale.   Neurological:      Mental Status: He is alert and oriented to person, place, and time.      Sensory: No sensory deficit.      Motor: No weakness.   Psychiatric:         Behavior: Behavior normal. Behavior is cooperative.     Influenza A/B swab: negative      Assessment/Plan:     1. Viral URI with cough  - POCT Influenza A/B    Discussed likely self-limiting viral etiology and expected course and " duration of illness. Vital signs stable, afebrile, no acute distress at this time. Symptoms improving.    Work note provided.    Differential diagnosis, natural history, supportive care, rest, fluids, over-the-counter symptom management per 's instructions, close monitoring, and indications for immediate follow-up discussed.     OTC list given.    All questions answered. Patient agrees with the plan of care.    Discharge summary provided.

## 2022-05-29 NOTE — LETTER
May 29, 2022         Patient: Patricio Lundberg   YOB: 1985   Date of Visit: 5/29/2022           To Whom it May Concern:    Patricio Lundberg was seen in my clinic on 5/29/2022 due to illness. Due to medical necessity, please excuse patient from work 5/27, 5/28, and 5/29. He may return to work at his next scheduled shift if feeling better.    If you have any questions or concerns, please don't hesitate to call.        Sincerely,         TARIQ Castillo.  Electronically Signed

## 2022-07-26 ENCOUNTER — OFFICE VISIT (OUTPATIENT)
Dept: MEDICAL GROUP | Age: 37
End: 2022-07-26
Payer: COMMERCIAL

## 2022-07-26 ENCOUNTER — HOSPITAL ENCOUNTER (OUTPATIENT)
Dept: LAB | Facility: MEDICAL CENTER | Age: 37
End: 2022-07-26
Attending: INTERNAL MEDICINE
Payer: COMMERCIAL

## 2022-07-26 VITALS
WEIGHT: 221 LBS | BODY MASS INDEX: 31.64 KG/M2 | SYSTOLIC BLOOD PRESSURE: 112 MMHG | HEIGHT: 70 IN | DIASTOLIC BLOOD PRESSURE: 78 MMHG | HEART RATE: 95 BPM | TEMPERATURE: 97.9 F | OXYGEN SATURATION: 98 % | RESPIRATION RATE: 16 BRPM

## 2022-07-26 DIAGNOSIS — E29.1 HYPOGONADISM MALE: ICD-10-CM

## 2022-07-26 DIAGNOSIS — E78.2 MIXED HYPERLIPIDEMIA: ICD-10-CM

## 2022-07-26 DIAGNOSIS — Z20.2 POSSIBLE EXPOSURE TO STD: ICD-10-CM

## 2022-07-26 DIAGNOSIS — Z86.59 HISTORY OF RECENT STRESSFUL LIFE EVENT: ICD-10-CM

## 2022-07-26 DIAGNOSIS — F17.200 TOBACCO DEPENDENCE: ICD-10-CM

## 2022-07-26 DIAGNOSIS — R74.8 ELEVATED LIVER ENZYMES: ICD-10-CM

## 2022-07-26 DIAGNOSIS — E66.9 OBESITY (BMI 30-39.9): ICD-10-CM

## 2022-07-26 PROBLEM — E66.09 OBESITY DUE TO EXCESS CALORIES WITH SERIOUS COMORBIDITY: Status: ACTIVE | Noted: 2022-07-26

## 2022-07-26 PROBLEM — R53.83 LOW ENERGY: Status: RESOLVED | Noted: 2019-11-18 | Resolved: 2022-07-26

## 2022-07-26 PROCEDURE — 87389 HIV-1 AG W/HIV-1&-2 AB AG IA: CPT

## 2022-07-26 PROCEDURE — 87591 N.GONORRHOEAE DNA AMP PROB: CPT

## 2022-07-26 PROCEDURE — 87491 CHLMYD TRACH DNA AMP PROBE: CPT

## 2022-07-26 PROCEDURE — 36415 COLL VENOUS BLD VENIPUNCTURE: CPT

## 2022-07-26 PROCEDURE — 86780 TREPONEMA PALLIDUM: CPT

## 2022-07-26 PROCEDURE — 99214 OFFICE O/P EST MOD 30 MIN: CPT | Performed by: INTERNAL MEDICINE

## 2022-07-26 ASSESSMENT — ENCOUNTER SYMPTOMS
CARDIOVASCULAR NEGATIVE: 1
GASTROINTESTINAL NEGATIVE: 1
RESPIRATORY NEGATIVE: 1
PSYCHIATRIC NEGATIVE: 1
CONSTITUTIONAL NEGATIVE: 1
NEUROLOGICAL NEGATIVE: 1
EYES NEGATIVE: 1
MUSCULOSKELETAL NEGATIVE: 1

## 2022-07-26 ASSESSMENT — FIBROSIS 4 INDEX: FIB4 SCORE: 0.39

## 2022-07-26 NOTE — PROGRESS NOTES
"Subjective     Patricio Lundberg is a 36 y.o. male who presents with Follow-Up (Testosterone and STD testing )  Patient comes in today for STD screening.  He is going through divorce and has been sexually active with multiple partners.  He has no symptoms and denies any urethral discharge penile rash lymphadenopathy in the groin fever chills joint pain or sore throat.    He is also being seen for testosterone deficiency and is on testosterone injections every 2 weeks and will need to follow-up on this in 3-4 months with repeat blood work.  Also has history of low vitamin D levels and advised to remain on vitamin D supplements and continue with his weight reduction plan and has lost 11 pounds in the last month.  Follow-up of his fatty liver will be taken at the next blood work as well as his dyslipidemia.          HPI    Review of Systems   Constitutional: Negative.    HENT: Negative.    Eyes: Negative.    Respiratory: Negative.    Cardiovascular: Negative.    Gastrointestinal: Negative.    Genitourinary: Negative.    Musculoskeletal: Negative.    Skin: Negative.    Neurological: Negative.    Endo/Heme/Allergies: Negative.    Psychiatric/Behavioral: Negative.               Objective     /78 (BP Location: Right arm, Patient Position: Sitting, BP Cuff Size: Adult)   Pulse 95   Temp 36.6 °C (97.9 °F) (Temporal)   Resp 16   Ht 1.778 m (5' 10\")   Wt 100 kg (221 lb)   SpO2 98%   BMI 31.71 kg/m²      Physical Exam  Constitutional:       General: He is not in acute distress.     Appearance: He is well-developed. He is not diaphoretic.   HENT:      Head: Normocephalic and atraumatic.      Right Ear: External ear normal.      Left Ear: External ear normal.      Nose: Nose normal.      Mouth/Throat:      Pharynx: No oropharyngeal exudate.   Eyes:      General: No scleral icterus.        Right eye: No discharge.         Left eye: No discharge.      Conjunctiva/sclera: Conjunctivae normal.      Pupils: Pupils are " equal, round, and reactive to light.   Neck:      Thyroid: No thyromegaly.      Vascular: No JVD.      Trachea: No tracheal deviation.   Cardiovascular:      Rate and Rhythm: Normal rate and regular rhythm.      Heart sounds: Normal heart sounds. No murmur heard.    No friction rub. No gallop.   Pulmonary:      Effort: Pulmonary effort is normal. No respiratory distress.      Breath sounds: Normal breath sounds. No stridor. No wheezing or rales.   Chest:      Chest wall: No tenderness.   Abdominal:      General: Bowel sounds are normal. There is no distension.      Palpations: Abdomen is soft. There is no mass.      Tenderness: There is no abdominal tenderness. There is no guarding or rebound.   Musculoskeletal:         General: No tenderness. Normal range of motion.      Cervical back: Normal range of motion and neck supple.   Lymphadenopathy:      Cervical: No cervical adenopathy.   Skin:     General: Skin is warm and dry.      Coloration: Skin is not pale.      Findings: No erythema or rash.   Neurological:      Mental Status: He is alert and oriented to person, place, and time.      Motor: No abnormal muscle tone.      Coordination: Coordination normal.      Deep Tendon Reflexes: Reflexes are normal and symmetric. Reflexes normal.   Psychiatric:         Behavior: Behavior normal.         Thought Content: Thought content normal.         Judgment: Judgment normal.       No visits with results within 1 Month(s) from this visit.   Latest known visit with results is:   Office Visit on 05/29/2022   Component Date Value   • Rapid Influenza A-B 05/29/2022 Negative    • Internal Control Positive 05/29/2022 Positive    • Internal Control Negative 05/29/2022 Negative       No results found for: HBA1C  Lab Results   Component Value Date/Time    SODIUM 138 04/25/2022 08:26 AM    POTASSIUM 4.4 04/25/2022 08:26 AM    CHLORIDE 104 04/25/2022 08:26 AM    CO2 25 04/25/2022 08:26 AM    GLUCOSE 98 04/25/2022 08:26 AM    BUN 14  04/25/2022 08:26 AM    CREATININE 0.99 04/25/2022 08:26 AM    CREATININE 1.0 09/27/2008 12:30 PM    ALKPHOSPHAT 79 04/25/2022 08:26 AM    ASTSGOT 21 04/25/2022 08:26 AM    ALTSGPT 56 (H) 04/25/2022 08:26 AM    TBILIRUBIN 0.3 04/25/2022 08:26 AM     No results found for: INR  Lab Results   Component Value Date/Time    CHOLSTRLTOT 206 (H) 04/25/2022 08:26 AM     (H) 04/25/2022 08:26 AM    HDL 36 (A) 04/25/2022 08:26 AM    TRIGLYCERIDE 143 04/25/2022 08:26 AM       Lab Results   Component Value Date/Time    TESTOSTERONE 333 04/25/2022 08:26 AM     No results found for: TSH  No results found for: FREET4  No results found for: URICACID  No components found for: VITB12  Lab Results   Component Value Date/Time    25HYDROXY 20 (L) 04/25/2022 08:26 AM   '                        Assessment & Plan        1. Possible exposure to STD      - HIV AG/AB COMBO ASSAY SCREENING; Future  - HPV by PCR; Future  - RPR  - Chlamydia/GC, PCR (Genital/Anal swab); Future    2. Obesity (BMI 30-39.9)    diet/exercise/lose 15 lbs.; patient counseled    - Patient identified as having weight management issue.  Appropriate orders and counseling given.    3. Tobacco dependence   Patient counseled. Encouraged to quit tobacco products. NicoDerm prescribed.      4. Mixed hyperlipidemia- mild no meds    diet/exercise/lose 15 lbs.; patient counseled      5. Elevated liver enzymes- borderline; needs angela 3 mos; prob fatty liver    diet/exercise/lose 15 lbs.; patient counseled      6. Hypogonadism male     Under good control. Continue same regimen..goo'  Continue testosterone injections every 2 weeks and recheck labs in 3 to 4 months  7. History of recent stressful life event- going through divorce      Offered counseling but declined.  Will consider in the near future.        Virtual visit in 1 to 2 weeks to review STD screening results.    Virtual visit in 3 to 4 months for follow-up of his testosterone treatment and lab review which should include  CBC PSA diagnostic, CHEM panel, vitamin D level, lipid panel

## 2022-07-27 LAB
HIV 1+2 AB+HIV1 P24 AG SERPL QL IA: NORMAL
T PALLIDUM AB SER QL IA: NORMAL

## 2022-07-28 LAB
C TRACH DNA SPEC QL NAA+PROBE: NEGATIVE
N GONORRHOEA DNA SPEC QL NAA+PROBE: NEGATIVE
SPECIMEN SOURCE: NORMAL

## 2022-10-18 ENCOUNTER — OFFICE VISIT (OUTPATIENT)
Dept: URGENT CARE | Facility: CLINIC | Age: 37
End: 2022-10-18
Payer: COMMERCIAL

## 2022-10-18 VITALS
BODY MASS INDEX: 31.24 KG/M2 | HEIGHT: 70 IN | HEART RATE: 75 BPM | TEMPERATURE: 98.6 F | SYSTOLIC BLOOD PRESSURE: 118 MMHG | RESPIRATION RATE: 16 BRPM | WEIGHT: 218.2 LBS | OXYGEN SATURATION: 95 % | DIASTOLIC BLOOD PRESSURE: 78 MMHG

## 2022-10-18 DIAGNOSIS — S09.90XA CLOSED HEAD INJURY, INITIAL ENCOUNTER: ICD-10-CM

## 2022-10-18 DIAGNOSIS — S39.012A STRAIN OF LUMBAR REGION, INITIAL ENCOUNTER: ICD-10-CM

## 2022-10-18 PROCEDURE — 99214 OFFICE O/P EST MOD 30 MIN: CPT | Performed by: PHYSICIAN ASSISTANT

## 2022-10-18 RX ORDER — TESTOSTERONE CYPIONATE 200 MG/ML
200 INJECTION, SOLUTION INTRAMUSCULAR
COMMUNITY
Start: 2022-07-28 | End: 2022-11-30 | Stop reason: SDUPTHER

## 2022-10-18 RX ORDER — TIZANIDINE 4 MG/1
2-4 TABLET ORAL EVERY 6 HOURS PRN
Qty: 30 TABLET | Refills: 0 | Status: SHIPPED | OUTPATIENT
Start: 2022-10-18 | End: 2022-11-30

## 2022-10-18 RX ORDER — NAPROXEN 500 MG/1
500 TABLET ORAL 2 TIMES DAILY WITH MEALS
Qty: 30 TABLET | Refills: 0 | Status: SHIPPED | OUTPATIENT
Start: 2022-10-18 | End: 2022-11-01

## 2022-10-18 ASSESSMENT — ENCOUNTER SYMPTOMS
SPEECH CHANGE: 0
LOSS OF CONSCIOUSNESS: 0
BACK PAIN: 1
SENSORY CHANGE: 0
HEADACHES: 1
DIZZINESS: 0
MYALGIAS: 1
BLURRED VISION: 0
DOUBLE VISION: 0

## 2022-10-18 ASSESSMENT — FIBROSIS 4 INDEX: FIB4 SCORE: 0.41
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
## 2022-11-01 ENCOUNTER — ANESTHESIA (OUTPATIENT)
Dept: SURGERY | Facility: MEDICAL CENTER | Age: 37
End: 2022-11-01
Payer: COMMERCIAL

## 2022-11-01 ENCOUNTER — ANESTHESIA EVENT (OUTPATIENT)
Dept: SURGERY | Facility: MEDICAL CENTER | Age: 37
End: 2022-11-01
Payer: COMMERCIAL

## 2022-11-01 ENCOUNTER — APPOINTMENT (OUTPATIENT)
Dept: RADIOLOGY | Facility: MEDICAL CENTER | Age: 37
End: 2022-11-01
Attending: EMERGENCY MEDICINE
Payer: COMMERCIAL

## 2022-11-01 ENCOUNTER — HOSPITAL ENCOUNTER (EMERGENCY)
Facility: MEDICAL CENTER | Age: 37
End: 2022-11-01
Attending: EMERGENCY MEDICINE
Payer: COMMERCIAL

## 2022-11-01 VITALS
RESPIRATION RATE: 12 BRPM | HEART RATE: 77 BPM | HEIGHT: 70 IN | BODY MASS INDEX: 31.56 KG/M2 | WEIGHT: 220.46 LBS | TEMPERATURE: 97.8 F | DIASTOLIC BLOOD PRESSURE: 56 MMHG | OXYGEN SATURATION: 99 % | SYSTOLIC BLOOD PRESSURE: 108 MMHG

## 2022-11-01 DIAGNOSIS — G89.18 ACUTE POSTOPERATIVE PAIN: ICD-10-CM

## 2022-11-01 DIAGNOSIS — K35.30 ACUTE APPENDICITIS WITH LOCALIZED PERITONITIS, UNSPECIFIED WHETHER ABSCESS PRESENT, UNSPECIFIED WHETHER GANGRENE PRESENT, UNSPECIFIED WHETHER PERFORATION PRESENT: ICD-10-CM

## 2022-11-01 LAB
ALBUMIN SERPL BCP-MCNC: 4.3 G/DL (ref 3.2–4.9)
ALBUMIN/GLOB SERPL: 1.6 G/DL
ALP SERPL-CCNC: 68 U/L (ref 30–99)
ALT SERPL-CCNC: 30 U/L (ref 2–50)
ANION GAP SERPL CALC-SCNC: 7 MMOL/L (ref 7–16)
APPEARANCE UR: CLEAR
AST SERPL-CCNC: 22 U/L (ref 12–45)
BASOPHILS # BLD AUTO: 0.5 % (ref 0–1.8)
BASOPHILS # BLD: 0.08 K/UL (ref 0–0.12)
BILIRUB SERPL-MCNC: 0.2 MG/DL (ref 0.1–1.5)
BILIRUB UR QL STRIP.AUTO: NEGATIVE
BUN SERPL-MCNC: 13 MG/DL (ref 8–22)
CALCIUM SERPL-MCNC: 8.7 MG/DL (ref 8.5–10.5)
CHLORIDE SERPL-SCNC: 105 MMOL/L (ref 96–112)
CO2 SERPL-SCNC: 25 MMOL/L (ref 20–33)
COLOR UR: YELLOW
CREAT SERPL-MCNC: 0.87 MG/DL (ref 0.5–1.4)
EOSINOPHIL # BLD AUTO: 0.48 K/UL (ref 0–0.51)
EOSINOPHIL NFR BLD: 3.3 % (ref 0–6.9)
ERYTHROCYTE [DISTWIDTH] IN BLOOD BY AUTOMATED COUNT: 40.9 FL (ref 35.9–50)
GFR SERPLBLD CREATININE-BSD FMLA CKD-EPI: 112 ML/MIN/1.73 M 2
GLOBULIN SER CALC-MCNC: 2.7 G/DL (ref 1.9–3.5)
GLUCOSE SERPL-MCNC: 113 MG/DL (ref 65–99)
GLUCOSE UR STRIP.AUTO-MCNC: NEGATIVE MG/DL
HCT VFR BLD AUTO: 51.5 % (ref 42–52)
HGB BLD-MCNC: 17.1 G/DL (ref 14–18)
IMM GRANULOCYTES # BLD AUTO: 0.06 K/UL (ref 0–0.11)
IMM GRANULOCYTES NFR BLD AUTO: 0.4 % (ref 0–0.9)
KETONES UR STRIP.AUTO-MCNC: NEGATIVE MG/DL
LEUKOCYTE ESTERASE UR QL STRIP.AUTO: NEGATIVE
LIPASE SERPL-CCNC: 59 U/L (ref 11–82)
LYMPHOCYTES # BLD AUTO: 2.6 K/UL (ref 1–4.8)
LYMPHOCYTES NFR BLD: 17.6 % (ref 22–41)
MCH RBC QN AUTO: 29.9 PG (ref 27–33)
MCHC RBC AUTO-ENTMCNC: 33.2 G/DL (ref 33.7–35.3)
MCV RBC AUTO: 90.2 FL (ref 81.4–97.8)
MICRO URNS: ABNORMAL
MONOCYTES # BLD AUTO: 0.88 K/UL (ref 0–0.85)
MONOCYTES NFR BLD AUTO: 6 % (ref 0–13.4)
NEUTROPHILS # BLD AUTO: 10.66 K/UL (ref 1.82–7.42)
NEUTROPHILS NFR BLD: 72.2 % (ref 44–72)
NITRITE UR QL STRIP.AUTO: NEGATIVE
NRBC # BLD AUTO: 0 K/UL
NRBC BLD-RTO: 0 /100 WBC
PATHOLOGY CONSULT NOTE: NORMAL
PH UR STRIP.AUTO: 5 [PH] (ref 5–8)
PLATELET # BLD AUTO: 238 K/UL (ref 164–446)
PMV BLD AUTO: 10.1 FL (ref 9–12.9)
POTASSIUM SERPL-SCNC: 4.2 MMOL/L (ref 3.6–5.5)
PROT SERPL-MCNC: 7 G/DL (ref 6–8.2)
PROT UR QL STRIP: NEGATIVE MG/DL
RBC # BLD AUTO: 5.71 M/UL (ref 4.7–6.1)
RBC UR QL AUTO: NEGATIVE
SODIUM SERPL-SCNC: 137 MMOL/L (ref 135–145)
SP GR UR STRIP.AUTO: >=1.045
UROBILINOGEN UR STRIP.AUTO-MCNC: 0.2 MG/DL
WBC # BLD AUTO: 14.8 K/UL (ref 4.8–10.8)

## 2022-11-01 PROCEDURE — 160009 HCHG ANES TIME/MIN: Performed by: SURGERY

## 2022-11-01 PROCEDURE — 700105 HCHG RX REV CODE 258: Performed by: EMERGENCY MEDICINE

## 2022-11-01 PROCEDURE — 85025 COMPLETE CBC W/AUTO DIFF WBC: CPT

## 2022-11-01 PROCEDURE — 700102 HCHG RX REV CODE 250 W/ 637 OVERRIDE(OP): Performed by: STUDENT IN AN ORGANIZED HEALTH CARE EDUCATION/TRAINING PROGRAM

## 2022-11-01 PROCEDURE — 74177 CT ABD & PELVIS W/CONTRAST: CPT

## 2022-11-01 PROCEDURE — 160002 HCHG RECOVERY MINUTES (STAT): Performed by: SURGERY

## 2022-11-01 PROCEDURE — 700101 HCHG RX REV CODE 250: Performed by: EMERGENCY MEDICINE

## 2022-11-01 PROCEDURE — 00790 ANES IPER UPR ABD NOS: CPT | Performed by: STUDENT IN AN ORGANIZED HEALTH CARE EDUCATION/TRAINING PROGRAM

## 2022-11-01 PROCEDURE — 700117 HCHG RX CONTRAST REV CODE 255: Performed by: EMERGENCY MEDICINE

## 2022-11-01 PROCEDURE — 36415 COLL VENOUS BLD VENIPUNCTURE: CPT

## 2022-11-01 PROCEDURE — 160048 HCHG OR STATISTICAL LEVEL 1-5: Performed by: SURGERY

## 2022-11-01 PROCEDURE — 81003 URINALYSIS AUTO W/O SCOPE: CPT

## 2022-11-01 PROCEDURE — 80053 COMPREHEN METABOLIC PANEL: CPT

## 2022-11-01 PROCEDURE — 160029 HCHG SURGERY MINUTES - 1ST 30 MINS LEVEL 4: Performed by: SURGERY

## 2022-11-01 PROCEDURE — 700111 HCHG RX REV CODE 636 W/ 250 OVERRIDE (IP): Performed by: STUDENT IN AN ORGANIZED HEALTH CARE EDUCATION/TRAINING PROGRAM

## 2022-11-01 PROCEDURE — 83690 ASSAY OF LIPASE: CPT

## 2022-11-01 PROCEDURE — 160035 HCHG PACU - 1ST 60 MINS PHASE I: Performed by: SURGERY

## 2022-11-01 PROCEDURE — 700101 HCHG RX REV CODE 250: Performed by: SURGERY

## 2022-11-01 PROCEDURE — 700111 HCHG RX REV CODE 636 W/ 250 OVERRIDE (IP): Performed by: EMERGENCY MEDICINE

## 2022-11-01 PROCEDURE — 700105 HCHG RX REV CODE 258: Performed by: STUDENT IN AN ORGANIZED HEALTH CARE EDUCATION/TRAINING PROGRAM

## 2022-11-01 PROCEDURE — 160046 HCHG PACU - 1ST 60 MINS PHASE II: Performed by: SURGERY

## 2022-11-01 PROCEDURE — 96376 TX/PRO/DX INJ SAME DRUG ADON: CPT

## 2022-11-01 PROCEDURE — 160025 RECOVERY II MINUTES (STATS): Performed by: SURGERY

## 2022-11-01 PROCEDURE — 700101 HCHG RX REV CODE 250: Performed by: STUDENT IN AN ORGANIZED HEALTH CARE EDUCATION/TRAINING PROGRAM

## 2022-11-01 PROCEDURE — 96365 THER/PROPH/DIAG IV INF INIT: CPT

## 2022-11-01 PROCEDURE — 44970 LAPAROSCOPY APPENDECTOMY: CPT | Performed by: SURGERY

## 2022-11-01 PROCEDURE — 160036 HCHG PACU - EA ADDL 30 MINS PHASE I: Performed by: SURGERY

## 2022-11-01 PROCEDURE — 96375 TX/PRO/DX INJ NEW DRUG ADDON: CPT

## 2022-11-01 PROCEDURE — A9270 NON-COVERED ITEM OR SERVICE: HCPCS | Performed by: STUDENT IN AN ORGANIZED HEALTH CARE EDUCATION/TRAINING PROGRAM

## 2022-11-01 PROCEDURE — 160041 HCHG SURGERY MINUTES - EA ADDL 1 MIN LEVEL 4: Performed by: SURGERY

## 2022-11-01 PROCEDURE — 99283 EMERGENCY DEPT VISIT LOW MDM: CPT | Mod: 57 | Performed by: SURGERY

## 2022-11-01 PROCEDURE — 88304 TISSUE EXAM BY PATHOLOGIST: CPT

## 2022-11-01 PROCEDURE — 99291 CRITICAL CARE FIRST HOUR: CPT

## 2022-11-01 RX ORDER — DEXAMETHASONE SODIUM PHOSPHATE 4 MG/ML
INJECTION, SOLUTION INTRA-ARTICULAR; INTRALESIONAL; INTRAMUSCULAR; INTRAVENOUS; SOFT TISSUE PRN
Status: DISCONTINUED | OUTPATIENT
Start: 2022-11-01 | End: 2022-11-01 | Stop reason: SURG

## 2022-11-01 RX ORDER — HALOPERIDOL 5 MG/ML
1 INJECTION INTRAMUSCULAR
Status: DISCONTINUED | OUTPATIENT
Start: 2022-11-01 | End: 2022-11-01 | Stop reason: HOSPADM

## 2022-11-01 RX ORDER — MIDAZOLAM HYDROCHLORIDE 1 MG/ML
INJECTION INTRAMUSCULAR; INTRAVENOUS PRN
Status: DISCONTINUED | OUTPATIENT
Start: 2022-11-01 | End: 2022-11-01 | Stop reason: SURG

## 2022-11-01 RX ORDER — SIMETHICONE 125 MG
250 TABLET,CHEWABLE ORAL ONCE
COMMUNITY
End: 2022-11-30

## 2022-11-01 RX ORDER — ONDANSETRON 2 MG/ML
INJECTION INTRAMUSCULAR; INTRAVENOUS PRN
Status: DISCONTINUED | OUTPATIENT
Start: 2022-11-01 | End: 2022-11-01 | Stop reason: SURG

## 2022-11-01 RX ORDER — ROCURONIUM BROMIDE 10 MG/ML
INJECTION, SOLUTION INTRAVENOUS PRN
Status: DISCONTINUED | OUTPATIENT
Start: 2022-11-01 | End: 2022-11-01 | Stop reason: SURG

## 2022-11-01 RX ORDER — METRONIDAZOLE 500 MG/100ML
INJECTION, SOLUTION INTRAVENOUS PRN
Status: DISCONTINUED | OUTPATIENT
Start: 2022-11-01 | End: 2022-11-01 | Stop reason: SURG

## 2022-11-01 RX ORDER — MORPHINE SULFATE 4 MG/ML
4 INJECTION INTRAVENOUS ONCE
Status: COMPLETED | OUTPATIENT
Start: 2022-11-01 | End: 2022-11-01

## 2022-11-01 RX ORDER — DIPHENHYDRAMINE HYDROCHLORIDE 50 MG/ML
12.5 INJECTION INTRAMUSCULAR; INTRAVENOUS
Status: DISCONTINUED | OUTPATIENT
Start: 2022-11-01 | End: 2022-11-01 | Stop reason: HOSPADM

## 2022-11-01 RX ORDER — OXYCODONE HCL 5 MG/5 ML
5 SOLUTION, ORAL ORAL
Status: COMPLETED | OUTPATIENT
Start: 2022-11-01 | End: 2022-11-01

## 2022-11-01 RX ORDER — ONDANSETRON 2 MG/ML
4 INJECTION INTRAMUSCULAR; INTRAVENOUS
Status: DISCONTINUED | OUTPATIENT
Start: 2022-11-01 | End: 2022-11-01 | Stop reason: HOSPADM

## 2022-11-01 RX ORDER — HYDRALAZINE HYDROCHLORIDE 20 MG/ML
5 INJECTION INTRAMUSCULAR; INTRAVENOUS
Status: DISCONTINUED | OUTPATIENT
Start: 2022-11-01 | End: 2022-11-01 | Stop reason: HOSPADM

## 2022-11-01 RX ORDER — HYDROMORPHONE HYDROCHLORIDE 1 MG/ML
0.4 INJECTION, SOLUTION INTRAMUSCULAR; INTRAVENOUS; SUBCUTANEOUS
Status: DISCONTINUED | OUTPATIENT
Start: 2022-11-01 | End: 2022-11-01 | Stop reason: HOSPADM

## 2022-11-01 RX ORDER — HYDROMORPHONE HYDROCHLORIDE 1 MG/ML
0.1 INJECTION, SOLUTION INTRAMUSCULAR; INTRAVENOUS; SUBCUTANEOUS
Status: DISCONTINUED | OUTPATIENT
Start: 2022-11-01 | End: 2022-11-01 | Stop reason: HOSPADM

## 2022-11-01 RX ORDER — MEPERIDINE HYDROCHLORIDE 25 MG/ML
12.5 INJECTION INTRAMUSCULAR; INTRAVENOUS; SUBCUTANEOUS
Status: DISCONTINUED | OUTPATIENT
Start: 2022-11-01 | End: 2022-11-01 | Stop reason: HOSPADM

## 2022-11-01 RX ORDER — SODIUM CHLORIDE, SODIUM LACTATE, POTASSIUM CHLORIDE, CALCIUM CHLORIDE 600; 310; 30; 20 MG/100ML; MG/100ML; MG/100ML; MG/100ML
INJECTION, SOLUTION INTRAVENOUS
Status: DISCONTINUED | OUTPATIENT
Start: 2022-11-01 | End: 2022-11-01 | Stop reason: SURG

## 2022-11-01 RX ORDER — BUPIVACAINE HYDROCHLORIDE AND EPINEPHRINE 5; 5 MG/ML; UG/ML
INJECTION, SOLUTION EPIDURAL; INTRACAUDAL; PERINEURAL
Status: DISCONTINUED | OUTPATIENT
Start: 2022-11-01 | End: 2022-11-01 | Stop reason: HOSPADM

## 2022-11-01 RX ORDER — METRONIDAZOLE 500 MG/100ML
500 INJECTION, SOLUTION INTRAVENOUS ONCE
Status: COMPLETED | OUTPATIENT
Start: 2022-11-01 | End: 2022-11-01

## 2022-11-01 RX ORDER — CEFAZOLIN SODIUM 1 G/3ML
INJECTION, POWDER, FOR SOLUTION INTRAMUSCULAR; INTRAVENOUS PRN
Status: DISCONTINUED | OUTPATIENT
Start: 2022-11-01 | End: 2022-11-01 | Stop reason: HOSPADM

## 2022-11-01 RX ORDER — CEFTRIAXONE 2 G/1
2000 INJECTION, POWDER, FOR SOLUTION INTRAMUSCULAR; INTRAVENOUS ONCE
Status: COMPLETED | OUTPATIENT
Start: 2022-11-01 | End: 2022-11-01

## 2022-11-01 RX ORDER — SODIUM CHLORIDE, SODIUM LACTATE, POTASSIUM CHLORIDE, CALCIUM CHLORIDE 600; 310; 30; 20 MG/100ML; MG/100ML; MG/100ML; MG/100ML
INJECTION, SOLUTION INTRAVENOUS CONTINUOUS
Status: DISCONTINUED | OUTPATIENT
Start: 2022-11-01 | End: 2022-11-01 | Stop reason: HOSPADM

## 2022-11-01 RX ORDER — OXYCODONE HCL 5 MG/5 ML
10 SOLUTION, ORAL ORAL
Status: COMPLETED | OUTPATIENT
Start: 2022-11-01 | End: 2022-11-01

## 2022-11-01 RX ORDER — OXYCODONE HYDROCHLORIDE 5 MG/1
5 TABLET ORAL EVERY 4 HOURS PRN
Qty: 12 TABLET | Refills: 0 | Status: SHIPPED | OUTPATIENT
Start: 2022-11-01 | End: 2022-11-04

## 2022-11-01 RX ORDER — SODIUM CHLORIDE, SODIUM LACTATE, POTASSIUM CHLORIDE, CALCIUM CHLORIDE 600; 310; 30; 20 MG/100ML; MG/100ML; MG/100ML; MG/100ML
1000 INJECTION, SOLUTION INTRAVENOUS ONCE
Status: COMPLETED | OUTPATIENT
Start: 2022-11-01 | End: 2022-11-01

## 2022-11-01 RX ORDER — HYDROMORPHONE HYDROCHLORIDE 1 MG/ML
0.2 INJECTION, SOLUTION INTRAMUSCULAR; INTRAVENOUS; SUBCUTANEOUS
Status: DISCONTINUED | OUTPATIENT
Start: 2022-11-01 | End: 2022-11-01 | Stop reason: HOSPADM

## 2022-11-01 RX ADMIN — OXYCODONE HYDROCHLORIDE 5 MG: 5 SOLUTION ORAL at 12:17

## 2022-11-01 RX ADMIN — ONDANSETRON 4 MG: 2 INJECTION INTRAMUSCULAR; INTRAVENOUS at 11:43

## 2022-11-01 RX ADMIN — CEFAZOLIN 2 G: 330 INJECTION, POWDER, FOR SOLUTION INTRAMUSCULAR; INTRAVENOUS at 11:11

## 2022-11-01 RX ADMIN — FENTANYL CITRATE 50 MCG: 50 INJECTION, SOLUTION INTRAMUSCULAR; INTRAVENOUS at 11:22

## 2022-11-01 RX ADMIN — METRONIDAZOLE 500 MG: 500 INJECTION, SOLUTION INTRAVENOUS at 11:04

## 2022-11-01 RX ADMIN — FENTANYL CITRATE 25 MCG: 50 INJECTION, SOLUTION INTRAMUSCULAR; INTRAVENOUS at 11:48

## 2022-11-01 RX ADMIN — METRONIDAZOLE 500 MG: 5 INJECTION, SOLUTION INTRAVENOUS at 10:10

## 2022-11-01 RX ADMIN — PROPOFOL 200 MG: 10 INJECTION, EMULSION INTRAVENOUS at 11:08

## 2022-11-01 RX ADMIN — MORPHINE SULFATE 4 MG: 4 INJECTION INTRAVENOUS at 07:22

## 2022-11-01 RX ADMIN — DEXAMETHASONE SODIUM PHOSPHATE 4 MG: 4 INJECTION, SOLUTION INTRA-ARTICULAR; INTRALESIONAL; INTRAMUSCULAR; INTRAVENOUS; SOFT TISSUE at 11:11

## 2022-11-01 RX ADMIN — IOHEXOL 100 ML: 350 INJECTION, SOLUTION INTRAVENOUS at 08:16

## 2022-11-01 RX ADMIN — SUGAMMADEX 200 MG: 100 INJECTION, SOLUTION INTRAVENOUS at 11:49

## 2022-11-01 RX ADMIN — SODIUM CHLORIDE, POTASSIUM CHLORIDE, SODIUM LACTATE AND CALCIUM CHLORIDE 1000 ML: 600; 310; 30; 20 INJECTION, SOLUTION INTRAVENOUS at 09:25

## 2022-11-01 RX ADMIN — SODIUM CHLORIDE, POTASSIUM CHLORIDE, SODIUM LACTATE AND CALCIUM CHLORIDE: 600; 310; 30; 20 INJECTION, SOLUTION INTRAVENOUS at 11:04

## 2022-11-01 RX ADMIN — MIDAZOLAM HYDROCHLORIDE 2 MG: 1 INJECTION, SOLUTION INTRAMUSCULAR; INTRAVENOUS at 11:05

## 2022-11-01 RX ADMIN — MORPHINE SULFATE 4 MG: 4 INJECTION, SOLUTION INTRAMUSCULAR; INTRAVENOUS at 10:27

## 2022-11-01 RX ADMIN — CEFTRIAXONE SODIUM 2000 MG: 2 INJECTION, POWDER, FOR SOLUTION INTRAMUSCULAR; INTRAVENOUS at 09:23

## 2022-11-01 RX ADMIN — FENTANYL CITRATE 100 MCG: 50 INJECTION, SOLUTION INTRAMUSCULAR; INTRAVENOUS at 11:08

## 2022-11-01 RX ADMIN — ROCURONIUM BROMIDE 40 MG: 10 INJECTION, SOLUTION INTRAVENOUS at 11:08

## 2022-11-01 ASSESSMENT — PAIN DESCRIPTION - PAIN TYPE: TYPE: SURGICAL PAIN

## 2022-11-01 ASSESSMENT — FIBROSIS 4 INDEX: FIB4 SCORE: 0.41

## 2022-11-01 ASSESSMENT — PAIN SCALES - GENERAL: PAIN_LEVEL: 3

## 2022-11-01 NOTE — ED PROVIDER NOTES
ED Provider Note    CHIEF COMPLAINT  Chief Complaint   Patient presents with    Abdominal Pain     Since 2pm, epigastric pain that radiates to RUQ and sharp shooting pain across lower abdomen. +nausea    Nausea       HPI  Patricio Lundberg is a 37 y.o. male who presents with right lower quadrant pain and epigastric pain starting at around 2 AM.  Awoke him from sleep.  Was in his usual state of health yesterday evening.  Had nausea without vomiting.  No prior abdominal surgeries.  He states that he has pain to the epigastric region however when he pushes abdomen, pain is greatest in the right lower quadrant.  When he pushed on the left side it also hurts his right side.  No fevers.  No chest pain or trouble breathing.  No cough.  No known recent sick contacts.  No testicular pain or swelling.  No dysuria or hematuria.    REVIEW OF SYSTEMS  See HPI for further details. All other systems are negative.     PAST MEDICAL HISTORY   has a past medical history of ASTHMA and Low energy (11/18/2019).    SOCIAL HISTORY  Social History     Tobacco Use    Smoking status: Every Day     Packs/day: 1.00     Types: Cigarettes    Smokeless tobacco: Never   Vaping Use    Vaping Use: Never used   Substance and Sexual Activity    Alcohol use: Yes     Comment: rarely     Drug use: No    Sexual activity: Yes       SURGICAL HISTORY  patient denies any surgical history    CURRENT MEDICATIONS  Home Medications       Reviewed by Yoselin Hernandez, R.N. (Registered Nurse) on 11/01/22 at 0639  Med List Status: Not Addressed     Medication Last Dose Status   naproxen (NAPROSYN) 500 MG Tab  Active   testosterone cypionate (DEPO-TESTOSTERONE) 200 MG/ML Solution injection  Active   tizanidine (ZANAFLEX) 4 MG Tab  Active   varenicline (CHANTIX) 0.5 MG tablet  Active   varenicline (CHANTIX) 1 MG tablet  Active   vitamin D3 (CHOLECALCIFEROL) 1000 Unit (25 mcg) Tab  Active                    ALLERGIES  Allergies   Allergen Reactions    Toradol  "Swelling     Per patient he felt like his throat was swelling shut.  After 10 minutes the sensation resolved       PHYSICAL EXAM  VITAL SIGNS: /82   Pulse 65   Temp 36.6 °C (97.8 °F) (Temporal)   Resp 16   Ht 1.778 m (5' 10\")   Wt 100 kg (220 lb 7.4 oz)   SpO2 99%   BMI 31.63 kg/m²   Pulse ox interpretation: I interpret this pulse ox as normal.  Constitutional: Alert in no apparent distress.  HENT: No signs of trauma, Bilateral external ears normal, Nose normal.   Eyes: Pupils are equal and reactive, Conjunctiva normal, Non-icteric.   Neck: Normal range of motion, Supple, No stridor.   Cardiovascular: Regular rate and rhythm.   Thorax & Lungs: Normal breath sounds, No respiratory distress  Abdomen: Bowel sounds normal, Soft, RLQ tenderness, + Rovsing sign, No masses, No pulsatile masses. No peritoneal signs.  Skin: Warm, Dry, No erythema, No rash.   Extremities: Intact distal pulses, No edema, No cyanosis  Musculoskeletal: Good range of motion in all major joints. No major deformities noted.   Neurologic: Alert, No focal deficits noted.       DIAGNOSTIC STUDIES / PROCEDURES      LABS  Labs Reviewed   CBC WITH DIFFERENTIAL - Abnormal; Notable for the following components:       Result Value    WBC 14.8 (*)     MCHC 33.2 (*)     Neutrophils-Polys 72.20 (*)     Lymphocytes 17.60 (*)     Neutrophils (Absolute) 10.66 (*)     Monos (Absolute) 0.88 (*)     All other components within normal limits   COMP METABOLIC PANEL - Abnormal; Notable for the following components:    Glucose 113 (*)     All other components within normal limits   URINALYSIS - Abnormal; Notable for the following components:    Specific Gravity >=1.045 (*)     All other components within normal limits   LIPASE   ESTIMATED GFR   HISTOLOGY REQUEST   PATHOLOGY SPECIMEN         RADIOLOGY  CT-ABDOMEN-PELVIS WITH   Final Result      Acute uncomplicated appendicitis.            COURSE & MEDICAL DECISION MAKING    Medications   lactated ringers " infusion ( Intravenous Restarted 11/1/22 1230)   fentaNYL (SUBLIMAZE) injection 25 mcg (has no administration in time range)     Or   fentaNYL (SUBLIMAZE) injection 50 mcg (has no administration in time range)   HYDROmorphone (Dilaudid) injection 0.1 mg (has no administration in time range)     Or   HYDROmorphone (Dilaudid) injection 0.2 mg (has no administration in time range)     Or   HYDROmorphone (Dilaudid) injection 0.4 mg (has no administration in time range)   meperidine (DEMEROL) injection 12.5 mg (has no administration in time range)   ondansetron (ZOFRAN) syringe/vial injection 4 mg (has no administration in time range)   haloperidol lactate (HALDOL) injection 1 mg (has no administration in time range)   diphenhydrAMINE (BENADRYL) injection 12.5 mg (has no administration in time range)   ePHEDrine injection 5 mg (has no administration in time range)   hydrALAZINE (APRESOLINE) injection 5 mg (has no administration in time range)   albuterol (PROVENTIL) 2.5mg/0.5ml nebulizer solution 2.5 mg (has no administration in time range)   morphine 4 MG/ML injection 4 mg (4 mg Intravenous Given 11/1/22 0722)   iohexol (OMNIPAQUE) 350 mg/mL (IV) (100 mL Intravenous Given 11/1/22 0816)   cefTRIAXone (Rocephin) injection 2,000 mg (2,000 mg Intravenous Given 11/1/22 0923)   metroNIDAZOLE (Flagyl) IVPB 500 mg (500 mg Intravenous New Bag 11/1/22 1010)   lactated ringers (LR) bolus (0 mL Intravenous Stopped 11/1/22 1025)   morphine 4 MG/ML injection 4 mg (4 mg Intravenous Given 11/1/22 1027)   oxyCODONE (ROXICODONE) oral solution 5 mg (5 mg Oral Given 11/1/22 1217)     Or   oxyCODONE (ROXICODONE) oral solution 10 mg ( Oral See Alternative 11/1/22 1217)       Pertinent Labs & Imaging studies reviewed. (See chart for details)  37 y.o. male presenting with right-sided abdominal pain starting last night.  Awoke him from sleep.  No associated vomiting here though has nausea.  No diarrhea though has some soft stool.  No recent  "sick contacts or ingestion of questionable foods.  No prior abdominal surgeries in the past.  Has tenderness to the right lower quadrant with positive Rovsing sign.  Symptoms are concerning for possible appendicitis.  biliary process is also a possibility though does not have Mayes sign.  No flank pain or dysuria or hematuria to suggest renal colic.    Laboratory studies were performed showing slight leukocytosis.  CT abdomen pelvis showing acute uncomplicated appendicitis.  Patient was started on IV antibiotics and IV fluid hydration.  Spoke with the general surgeon, Dr. Armstrong, who is agreeable to the hospitalization for surgical intervention.    The patient was instructed to follow-up with primary care physician for further management.  To return immediately for any worsening symptoms or development of any other concerning signs or symptoms. The patient verbalizes understanding in their own words.    /58   Pulse 63   Temp 36.6 °C (97.9 °F) (Temporal)   Resp (!) 11   Ht 1.778 m (5' 10\")   Wt 100 kg (220 lb 7.4 oz)   SpO2 89%   BMI 31.63 kg/m²     The patient was referred to primary care where they will receive further BP management.      [facility]  [address]  [facility] [id number] [phone]  Follow up  Call to make a follow up appointment in 1-2 weeks    FINAL IMPRESSION  1. Acute postoperative pain    2. Acute appendicitis with localized peritonitis, unspecified whether abscess present, unspecified whether gangrene present, unspecified whether perforation present            Electronically signed by: Dio Lomax M.D., 11/1/2022 7:06 AM    "

## 2022-11-01 NOTE — ANESTHESIA PREPROCEDURE EVALUATION
Case: [id number] Date/Time: 11/01/22 1015    Procedure: APPENDECTOMY, LAPAROSCOPIC (Abdomen)    Anesthesia type: General    Pre-op diagnosis: ACUTE APPENDICITIS    Location: [facility] OR 10 / SURGERY [facility]    Surgeons: Ritchie Barnett M.D.          Relevant Problems   NEURO   (positive) History of recent stressful life event- going through divorce       Physical Exam    Airway   Mallampati: II  TM distance: >3 FB  Neck ROM: full       Cardiovascular - normal exam  Rhythm: regular  Rate: normal  (-) murmur     Dental - normal exam           Pulmonary - normal exam  Breath sounds clear to auscultation     Abdominal    Neurological - normal exam                 Anesthesia Plan    ASA 2       Plan - general       Airway plan will be ETT          Induction: intravenous    Postoperative Plan: Postoperative administration of opioids is intended.    Pertinent diagnostic labs and testing reviewed    Informed Consent:    Anesthetic plan and risks discussed with patient.    Use of blood products discussed with: patient whom consented to blood products.

## 2022-11-01 NOTE — H&P
REFERRING PHYSICIAN: T    DATE OF SERVICE: 11/1/2022    CHIEF COMPLAINT: Right lower quadrant abdominal pain.     HISTORY OF PRESENT ILLNESS: The patient is a 37 y.o. male, who presents to the emergency department with abdominal pain. The patient developed the pain overnight and it has continued to worsen. The patient denies any recent or intercurrent illness. The patient has had no previous abdominal ailments.     PAST MEDICAL HISTORY:   Past Medical History:   Diagnosis Date    ASTHMA     Low energy 11/18/2019         PAST SURGICAL HISTORY: History reviewed. No pertinent surgical history.       ALLERGIES: Toradol - throat swelling     CURRENT MEDICATIONS:   Outpatient Medications Marked as Taking for the 11/1/22 encounter (Hospital Encounter)   Medication Sig    simethicone (MYLICON) 125 MG chewable tablet Chew 250 mg one time.    bismuth subsalicylate (PEPTO-BISMOL) 262 MG/15ML Suspension Take 30 mL by mouth one time.         FAMILY HISTORY: family history includes Cancer in his father.      SOCIAL HISTORY:  reports that he has been smoking cigarettes. He has been smoking an average of 1 pack per day. He has never used smokeless tobacco. He reports current alcohol use. He reports that he does not use drugs.      REVIEW OF SYSTEMS:   Constitutional: Negative for fever, chills, weight loss, malaise/fatigue and diaphoresis.   HENT: Negative for hearing loss, ear pain, nosebleeds, congestion, sore throat, neck pain, tinnitus and ear discharge.    Eyes: Negative for blurred vision, double vision, photophobia, pain, discharge and redness.   Respiratory: Negative for cough, hemoptysis, sputum production, shortness of breath, wheezing and stridor.    Cardiovascular: Negative for chest pain, palpitations, orthopnea, claudication, leg swelling and PND.   Gastrointestinal: Negative for heartburn, nausea, vomiting, abdominal pain, diarrhea, constipation, blood in stool and melena.   Genitourinary: Negative for dysuria,  "urgency, frequency, hematuria and flank pain.   Musculoskeletal: Negative for myalgias, back pain, joint pain and falls.   Skin: Negative for itching and rash.  Neurological: Negative for dizziness, tingling, tremors, sensory change, speech change, focal weakness, seizures, loss of consciousness, weakness and headaches.   Endo/Heme/Allergies: Negative for environmental allergies and polydipsia. Does not bruise/bleed easily.   Psychiatric/Behavioral: Negative for depression, suicidal ideas, hallucinations, memory loss and substance abuse. The patient is not nervous/anxious and does not have insomnia.    PHYSICAL EXAMINATION:   GENERAL: The patient is ill-appearing.   VITAL SIGNS: /69   Pulse 84   Temp 36.6 °C (97.8 °F) (Temporal)   Resp 18   Ht 1.778 m (5' 10\")   Wt 100 kg (220 lb 7.4 oz)   SpO2 92%   GENERAL:  Otherwise healthy-appearing and in no acute distress  CHEST:  Lungs are clear to auscultation bilaterally.  No masses, lesions, or signs of trauma were noted.     CARDIOVASCULAR:  Regular rate and rhythm.  No murmurs appreciated.  No JVD.  Palpable pulses present in all four extremities.    ABDOMEN:  Soft,  focally tender over the right lower abdominal quadrant, non-distended.  Non-tympanitic.  No incisional, umbilical, or inguinal hernias were appreciated.  SKIN:  Warm and well perfused. No rashes.  NEUROLOGIC:  Alert and oriented. Cranial nerves II through XII are grossly intact. Motor and sensory exams are normal in all four extremities. Motor and sensory reflexes are 2+ and symmetric with bilateral plantar responses.  PSYCHIATRIC: Affect and mood is appropriate for age and condition.    LABORATORY VALUES:   Recent Labs     11/01/22  0646   WBC 14.8*   RBC 5.71   HEMOGLOBIN 17.1   HEMATOCRIT 51.5   MCV 90.2   MCH 29.9   MCHC 33.2*   RDW 40.9   PLATELETCT 238   MPV 10.1     Recent Labs     11/01/22  0646   SODIUM 137   POTASSIUM 4.2   CHLORIDE 105   CO2 25   GLUCOSE 113*   BUN 13   CREATININE " 0.87   CALCIUM 8.7     Recent Labs     11/01/22  0646   ASTSGOT 22   ALTSGPT 30   TBILIRUBIN 0.2   ALKPHOSPHAT 68   GLOBULIN 2.7            IMAGING:   CT-ABDOMEN-PELVIS WITH   Final Result      Acute uncomplicated appendicitis.          IMPRESSION AND PLAN:  1) Acute Appendicitis with Localized Peritonitis:    Given the above presentation, the patient will be taken to the operating room for laparoscopic appendectomy. The surgical plan was discussed with the patient and available family. Potential complications were discussed in detail and include but are not limited to infection, bleeding, damage to adjacent tissues and organs, anesthetic complications . Questions were elicited and answered to the patient's and available family's satisfaction. The patient understands the rationale for surgery and agrees to proceed.  Operative consent was obtained.    Aggregated care time spent evaluating, reassessing, reviewing documentation, providing care, and managing this patient exclusive of procedures: 45 minutes  ____________________________________   MD OREN ANGELA / INDIO     DD: 11/1/2022   DT: 10:18 AM

## 2022-11-01 NOTE — OP REPORT
DATE OF OPERATION: 11/1/2022     PREOPERATIVE DIAGNOSIS: Acute appendicitis.     POSTOPERATIVE DIAGNOSIS: Acute non-perforated appendicitis.     PROCEDURE PERFORMED: Laparoscopic appendectomy.     SURGEON: Michel Barnett MD    ANESTHESIA: General endotracheal anesthesia.     INDICATIONS: The patient is a 37 y.o.-year-old male with clinical and radiographic findings of acute appendicitis.  The patient is taken to the operating room for laparoscopic appendectomy.     FINDINGS: The appendix was acutely thickened and dilated along its distal half.  The distal half was retrocecal in location.  Healthy and pliable appendiceal base.      SPECIMEN: Appendix.     ESTIMATED BLOOD LOSS: 5 mL.     PROCEDURE: Informed consent was obtained pre-operatively.  The patient voluntarily voided their urinary bladder just prior to being brought back to the OR.  The patient was taken back to the operating room and placed in supine position.  General endotracheal anesthesia was administered and the patient was intubated. Intravenous antibiotics were administered by the anesthesiologist in correct time interval. Sequential compression devices were placed. The abdomen was prepped and draped in a sterile fashion.  A time-out was performed and the patient and procedure were both verified.      Marcaine 0.5% with epinephrine was used to infiltrate the port sites. A 5 mm jacob-umbilical incision was made and subcutaneous tissue spread bluntly. The umbilical stalk was grasped with a Kocher clamp and elevated towards the ceiling.  A Veress needle was atraumatically inserted into the peritoneal space. A saline test was performed and supported proper intra-peritoneal placement.  Carbon dioxide gas was applied to the port and pneumoperitoneum was achieved.  The Veress needle was removed after adequate insufflation.      A 5 mm port was introduced into the peritoneal space through the jacob-umbilical incision. A laparoscope was placed through this  port.  The abdominal contents were inspected noted to be free of injury from Veress needle and port placement.  Additional 12 mm and 5 mm ports were placed in left lower quadrant and suprapubic region under direct visualization with a laparoscope, respectively.  The appendix was carefully identified, dissected free from surrounding adhesions, tissues and organs, and elevated toward the abdominal wall. The appendiceal mesentery was divided with a Ligasure device down to the appendiceal base as indicated by the splaying of the tenia coli from the adjacent cecum.  The appendix was then divided at its base with a single firing from an EndoPath 45mm stapler with a blue load.    The appendix was placed within an Endocatch bag and delivered intact from the abdominal cavity and submitted for permanent pathology. The appendiceal base was inspected and noted to be intact. Hemostasis of the divided mesentery and appendiceal stump were both satisfactory.  The left lower quadrant port site fascia was closed with an 0 vicryl suture.  The fascia was noted to be tight and well approximated.    All ports were then opened and the abdomen was allowed to deflate. All ports were then removed.  All skin incisions were closed with interrupted 4-0 Vicryl subcuticular sutures and dressed with Dermabond.     The patient tolerated the procedure well and there were no apparent complications. All sponge, sharps, and instrument counts were correct on 2 separate occasions. The patient was awakened, extubated, and transferred to the PACU in satisfactory condition.               [facility] Post-Operative Data:    Emergency Case?----- Yes  Wound Classification?----- Contaminated  Wound Closure?----- All Layers  ASA Classification?----- II   E    ____________________________________   Michel Barnett MD, FACS    JRU / NTS     DD: 11/1/2022   DT: 11:50 AM

## 2022-11-01 NOTE — ANESTHESIA TIME REPORT
Anesthesia Start and Stop Event Times     Date Time Event    11/1/2022 1027 Ready for Procedure     1104 Anesthesia Start     1205 Anesthesia Stop        Responsible Staff  11/01/22    Name Role Begin End    Peter Ariza D.O. Anesth 1104 1205        Overtime Reason:  no overtime (within assigned shift)    Comments:

## 2022-11-01 NOTE — ED TRIAGE NOTES
"Chief Complaint   Patient presents with    Abdominal Pain     Since 2pm, epigastric pain that radiates to RUQ and sharp shooting pain across lower abdomen. +nausea    Nausea       38 yo M to triage for above complaint. Protocol ordered.      Pt placed in lobby. Pt educated on triage process. Pt encouraged to alert staff for any changes.     Patient and staff wearing appropriate PPE    /82   Pulse 65   Temp 36.6 °C (97.8 °F) (Temporal)   Resp 16   Ht 1.778 m (5' 10\")   Wt 100 kg (220 lb 7.4 oz)   SpO2 99%   BMI 31.63 kg/m²     "

## 2022-11-01 NOTE — ANESTHESIA POSTPROCEDURE EVALUATION
Patient: Patricio Lundberg    Procedure Summary     Date: 11/01/22 Room / Location: [place] [address] / SURGERY [facility]    Anesthesia Start: 1104 Anesthesia Stop: 1205    Procedure: APPENDECTOMY, LAPAROSCOPIC (Abdomen) Diagnosis: (ACUTE APPENDICITIS)    Surgeons: Ritchie Barnett M.D. Responsible Provider: Peter Ariza D.O.    Anesthesia Type: general ASA Status: 2          Final Anesthesia Type: general  Last vitals  BP   Blood Pressure: 117/68    Temp   36.2 °C (97.2 °F)    Pulse   85   Resp   16    SpO2   93 %      Anesthesia Post Evaluation    Patient location during evaluation: PACU  Patient participation: complete - patient participated  Level of consciousness: awake and alert  Pain score: 3    Airway patency: patent  Anesthetic complications: no  Cardiovascular status: hemodynamically stable  Respiratory status: acceptable  Hydration status: euvolemic    PONV: none          No notable events documented.     Nurse Pain Score: 3 (NPRS)         Oswald Bustamante MD  Pediatric Cardiology  [address] [place] [address]  [phone]    General Guidelines    Name: Sweta Saldana                   : 2022    Diagnosis:   1. Skin tag of ear    2. PPS (peripheral pulmonic stenosis)    3. PFO (patent foramen ovale)    4. Aortic dilatation    5. Nonspecific abnormal electrocardiogram (ECG) (EKG)        PCP: Naveen Guevara MD  PCP Phone Number: [phone]    · If you have an emergency or you think you have an emergency, go to the nearest emergency room!     · Breathing too fast, doesnt look right, consistently not eating well, your child needs to be checked. These are general indications that your child is not feeling well. This may be caused by anything, a stomach virus, an ear ache or heart disease, so please call Naveen Guevara MD. If Naveen Guevara MD thinks you need to be checked for your heart, they will let us know.     · If your child experiences a rapid or very slow heart rate and has the following symptoms, call Naveen Guevara MD or go to the nearest emergency room.   · unexplained chest pain   · does not look right   · feels like they are going to pass out   · actually passes out for unexplained reasons   · weakness or fatigue   · shortness of breath  or breathing fast   · consistent poor feeding     · If your child experiences a rapid or very slow heart rate that lasts longer than 30 minutes call Naveen Guevara MD or go to the nearest emergency room.     · If your child feels like they are going to pass out - have them sit down or lay down immediately. Raise the feet above the head (prop the feet on a chair or the wall) until the feeling passes. Slowly allow the child to sit, then stand. If the feeling returns, lay back down and start over.     It is very important that you notify Naveen Guevara MD first. Naveen Guevara MD or the ER Physician can reach Dr. Oswald Bustamante at the office or through [facility]  PICU at [phone] as needed.    Call our office ([phone]) one week after ALL tests for results.     For appointments ENT (Dr. Kumar Boles), please call [phone].

## 2022-11-01 NOTE — ANESTHESIA PROCEDURE NOTES
Airway    Date/Time: 11/1/2022 11:09 AM  Performed by: Peter Ariza D.O.  Authorized by: Peter Ariza D.O.     Location:  OR  Urgency:  Elective  Difficult Airway: No    Indications for Airway Management:  Anesthesia      Spontaneous Ventilation: absent    Sedation Level:  Deep  Preoxygenated: Yes    Patient Position:  Sniffing  Mask Difficulty Assessment:  1 - vent by mask  Final Airway Type:  Endotracheal airway  Final Endotracheal Airway:  ETT  Cuffed: Yes    Technique Used for Successful ETT Placement:  Direct laryngoscopy    Insertion Site:  Oral  Blade Type:  Deb  Laryngoscope Blade/Videolaryngoscope Blade Size:  3  ETT Size (mm):  7.5  Measured from:  Teeth  ETT to Teeth (cm):  23  Placement Verified by: auscultation and capnometry    Cormack-Lehane Classification:  Grade IIb - view of arytenoids or posterior of glottis only  Number of Attempts at Approach:  1

## 2022-11-01 NOTE — DISCHARGE INSTRUCTIONS
HOME CARE INSTRUCTIONS    ACTIVITY: Rest and take it easy for the first 24 hours.  A responsible adult is recommended to remain with you during that time.  It is normal to feel sleepy.  We encourage you to not do anything that requires balance, judgment or coordination.    FOR 24 HOURS DO NOT:  Drive, operate machinery or run household appliances.  Drink beer or alcoholic beverages.  Make important decisions or sign legal documents.    SPECIAL INSTRUCTIONS:     Laparoscopic Appendectomy Discharge Instructions:    1. DIET: Upon discharge from the hospital you may resume your normal preoperative diet. Depending on how you are feeling and whether you have nausea or not, you may wish to stay with a bland diet for the first few days. However, you can advance this as quickly as you feel ready.    2. ACTIVITIES: You have a 15 pound weight restriction for two weeks after surgery.  Routine activities such as bathing, walking, going up and down stairs, and driving* (see below) are safe.  Avoid strenuous activities and exercise that involves twisting, bending, and, running.    3. DRIVING: You may drive whenever you are off pain medications and are able to perform the activities needed to drive, i.e. turning, bending, twisting, wearing a seat belt, etc.    4. WOUND/BATHING: You may get the wound wet at any time after leaving the hospital. You may shower, but do not submerge in a bath or a pool for at least a week.  Peel the skin glue off with your finger or a pair of tweezers one week after surgery.     5. BOWEL FUNCTION: A few patients, after this operation, will develop either frequent or loose stools after meals. This usually corrects itself after a few days, to a few weeks.     Much more common than loose stools, is constipation. The combination of pain medication and decreased activity level can cause constipation in otherwise normal patients. If you feel this is occurring, take an over the counter laxatives (Milk of  Magnesia, Ex-Lax, Senokot, Miralax, Magnesium Citrate, etc.) until the problem has resolved.    6. PAIN MEDICATION: You will be given a prescription for pain medication at discharge. Please take these as directed. It is important to remember not to take medications on an empty stomach as this may cause nausea.  Wean the use of pain medication as soon as possible.    7. CALL IF YOU HAVE: (1) Fevers to more than 101F, (2) Unusual chest or leg pain, (3) Drainage or fluid from incision that may be foul smelling, increased tenderness or soreness at the wound or the wound edges are no longer together, redness or swelling at the incision site. Please do not hesitate to call with any other questions.     8. APPOINTMENT: Contact our office at [phone] for a follow-up appointment in 1 to 2 weeks following your procedure.    If you have any additional questions, please do not hesitate to call the office and speak to the physician on call.    Office address:  [address] [place] [address], [address] [facility] [place] [id number] [place] [address] [facility]  [phone]      DIET: To avoid nausea, slowly advance diet as tolerated, avoiding spicy or greasy foods for the first day.  Add more substantial food to your diet according to your physician's instructions.  Babies can be fed formula or breast milk as soon as they are hungry.  INCREASE FLUIDS AND FIBER TO AVOID CONSTIPATION.    MEDICATIONS: Resume taking daily medication.  Take prescribed pain medication with food.  If no medication is prescribed, you may take non-aspirin pain medication if needed.  PAIN MEDICATION CAN BE VERY CONSTIPATING.  Take a stool softener or laxative such as senokot, pericolace, or milk of magnesia if needed.    Prescription given for oxycodone.  Last pain medication given at 12:17pm    A follow-up appointment should be arranged with your doctor in 1-2 weeks; call to schedule.    You should CALL YOUR PHYSICIAN if you develop:  Fever greater than 101 degrees F.  Pain not  relieved by medication, or persistent nausea or vomiting.  Excessive bleeding (blood soaking through dressing) or unexpected drainage from the wound.  Extreme redness or swelling around the incision site, drainage of pus or foul smelling drainage.  Inability to urinate or empty your bladder within 8 hours.  Problems with breathing or chest pain.    You should call 911 if you develop problems with breathing or chest pain.  If you are unable to contact your doctor or surgical center, you should go to the nearest emergency room or urgent care center.  Physician's telephone #: Dr. Barnett [phone].    MILD FLU-LIKE SYMPTOMS ARE NORMAL.  YOU MAY EXPERIENCE GENERALIZED MUSCLE ACHES, THROAT IRRITATION, HEADACHE AND/OR SOME NAUSEA.    If any questions arise, call your doctor.  If your doctor is not available, please feel free to call the Surgical Center at [phone].  The Center is open Monday through Friday from 7AM to 7PM.      A registered nurse may call you a few days after your surgery to see how you are doing after your procedure.    You may also receive a survey in the mail within the next two weeks and we ask that you take a few moments to complete the survey and return it to us.  Our goal is to provide you with very good care and we value your comments.     Depression / Suicide Risk    As you are discharged from this [facility] facility, it is important to learn how to keep safe from harming yourself.    Recognize the warning signs:  Abrupt changes in personality, positive or negative- including increase in energy   Giving away possessions  Change in eating patterns- significant weight changes-  positive or negative  Change in sleeping patterns- unable to sleep or sleeping all the time   Unwillingness or inability to communicate  Depression  Unusual sadness, discouragement and loneliness  Talk of wanting to die  Neglect of personal appearance   Rebelliousness- reckless behavior  Withdrawal from  people/activities they love  Confusion- inability to concentrate     If you or a loved one observes any of these behaviors or has concerns about self-harm, here's what you can do:  Talk about it- your feelings and reasons for harming yourself  Remove any means that you might use to hurt yourself (examples: pills, rope, extension cords, firearm)  Get professional help from the community (Mental Health, Substance Abuse, psychological counseling)  Do not be alone:Call your Safe Contact- someone whom you trust who will be there for you.  Call your local CRISIS HOTLINE 022-9853 or 247-476-9937  Call your local Children's Mobile Crisis Response Team [place] (626) 382-3768 or www.[place]  Call the toll free National Suicide Prevention Hotlines   National Suicide Prevention Lifeline 159-757-FTKO (7699)  National Hope Line Network 800-SUICIDE (583-0647)    I acknowledge receipt and understanding of these Home Care instructions.

## 2022-11-01 NOTE — OR NURSING
1202: pt arrived to PACU in stable condition. VSS. Lap sites to abd are [facility].     1215: pt medicated for mild pain. VSS. Pt denies nausea at this time. Pt updated on plan of care and all questions have been addressed.     1245:pt wife called and updated on pt status.     1300: pt verbalizes that pain is improving after pain medication.     1400: pt resting in bed and pain is tolerable. VSS. Pt working on incentive spirometry to improve oxygen sats. Pt verbalizes that he smokes so he is usually low. Pt abd sites are [facility]. Report called to Luli LOU. Pt taken to stage 2 in stable condition.

## 2022-11-01 NOTE — OR NURSING
Pt transferred to phase II. Vitals taken. IV removed, WNL. Up steady and voiding in bathroom.    Sites CDI. Discharge reviewed with pt and wife, questions encouraged and answered.

## 2022-11-07 ENCOUNTER — OFFICE VISIT (OUTPATIENT)
Dept: MEDICAL GROUP | Age: 37
End: 2022-11-07
Payer: COMMERCIAL

## 2022-11-07 VITALS
HEIGHT: 70 IN | SYSTOLIC BLOOD PRESSURE: 130 MMHG | HEART RATE: 87 BPM | RESPIRATION RATE: 16 BRPM | TEMPERATURE: 98 F | BODY MASS INDEX: 32.21 KG/M2 | OXYGEN SATURATION: 94 % | DIASTOLIC BLOOD PRESSURE: 84 MMHG | WEIGHT: 225 LBS

## 2022-11-07 DIAGNOSIS — E29.1 HYPOGONADISM MALE: ICD-10-CM

## 2022-11-07 DIAGNOSIS — F98.8 ATTENTION DEFICIT DISORDER (ADD) WITHOUT HYPERACTIVITY: ICD-10-CM

## 2022-11-07 DIAGNOSIS — F17.200 TOBACCO DEPENDENCE: ICD-10-CM

## 2022-11-07 DIAGNOSIS — E66.9 OBESITY (BMI 30-39.9): ICD-10-CM

## 2022-11-07 DIAGNOSIS — Z23 NEED FOR VACCINATION: ICD-10-CM

## 2022-11-07 DIAGNOSIS — S06.0X0A CONCUSSION WITHOUT LOSS OF CONSCIOUSNESS, INITIAL ENCOUNTER: ICD-10-CM

## 2022-11-07 DIAGNOSIS — Z09 HOSPITAL DISCHARGE FOLLOW-UP: ICD-10-CM

## 2022-11-07 DIAGNOSIS — E78.2 MIXED HYPERLIPIDEMIA: ICD-10-CM

## 2022-11-07 DIAGNOSIS — T75.3XXA SEA SICKNESS, INITIAL ENCOUNTER: ICD-10-CM

## 2022-11-07 PROCEDURE — 99214 OFFICE O/P EST MOD 30 MIN: CPT | Mod: 25 | Performed by: INTERNAL MEDICINE

## 2022-11-07 PROCEDURE — 90686 IIV4 VACC NO PRSV 0.5 ML IM: CPT | Performed by: INTERNAL MEDICINE

## 2022-11-07 PROCEDURE — 90471 IMMUNIZATION ADMIN: CPT | Performed by: INTERNAL MEDICINE

## 2022-11-07 RX ORDER — ONDANSETRON 4 MG/1
4 TABLET, FILM COATED ORAL EVERY 4 HOURS PRN
Qty: 20 TABLET | Refills: 1 | Status: SHIPPED | OUTPATIENT
Start: 2022-11-07 | End: 2023-08-03

## 2022-11-07 RX ORDER — SCOLOPAMINE TRANSDERMAL SYSTEM 1 MG/1
1 PATCH, EXTENDED RELEASE TRANSDERMAL
Qty: 4 PATCH | Refills: 1 | Status: SHIPPED | OUTPATIENT
Start: 2022-11-07 | End: 2023-08-03

## 2022-11-07 ASSESSMENT — FIBROSIS 4 INDEX: FIB4 SCORE: 0.66
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
## 2022-11-30 ENCOUNTER — TELEMEDICINE (OUTPATIENT)
Dept: MEDICAL GROUP | Age: 37
End: 2022-11-30
Payer: COMMERCIAL

## 2022-11-30 DIAGNOSIS — F17.200 TOBACCO DEPENDENCE: ICD-10-CM

## 2022-11-30 DIAGNOSIS — R74.8 ELEVATED LIVER ENZYMES: ICD-10-CM

## 2022-11-30 DIAGNOSIS — E66.09 CLASS 1 OBESITY DUE TO EXCESS CALORIES WITH SERIOUS COMORBIDITY AND BODY MASS INDEX (BMI) OF 32.0 TO 32.9 IN ADULT: ICD-10-CM

## 2022-11-30 DIAGNOSIS — S06.0XAD CONCUSSION WITH UNKNOWN LOSS OF CONSCIOUSNESS STATUS, SUBSEQUENT ENCOUNTER: ICD-10-CM

## 2022-11-30 DIAGNOSIS — F33.41 RECURRENT MAJOR DEPRESSIVE DISORDER, IN PARTIAL REMISSION (HCC): ICD-10-CM

## 2022-11-30 DIAGNOSIS — E29.1 HYPOGONADISM MALE: ICD-10-CM

## 2022-11-30 DIAGNOSIS — F98.8 ATTENTION DEFICIT DISORDER (ADD) WITHOUT HYPERACTIVITY: ICD-10-CM

## 2022-11-30 DIAGNOSIS — S00.93XD CONTUSION OF HEAD, SUBSEQUENT ENCOUNTER: ICD-10-CM

## 2022-11-30 DIAGNOSIS — E78.2 MIXED HYPERLIPIDEMIA: ICD-10-CM

## 2022-11-30 DIAGNOSIS — R41.3 MEMORY LOSS OF UNKNOWN CAUSE: ICD-10-CM

## 2022-11-30 PROBLEM — G89.18 ACUTE POSTOPERATIVE PAIN: Status: RESOLVED | Noted: 2022-11-01 | Resolved: 2022-11-30

## 2022-11-30 PROBLEM — S06.0XAA CONCUSSION WITH UNKNOWN LOSS OF CONSCIOUSNESS STATUS: Status: ACTIVE | Noted: 2022-11-30

## 2022-11-30 PROBLEM — K35.30 ACUTE APPENDICITIS WITH LOCALIZED PERITONITIS: Status: RESOLVED | Noted: 2022-11-01 | Resolved: 2022-11-30

## 2022-11-30 PROCEDURE — 99214 OFFICE O/P EST MOD 30 MIN: CPT | Mod: 95 | Performed by: INTERNAL MEDICINE

## 2022-11-30 RX ORDER — TESTOSTERONE CYPIONATE 200 MG/ML
200 INJECTION, SOLUTION INTRAMUSCULAR
Qty: 6 ML | Refills: 1 | Status: SHIPPED | OUTPATIENT
Start: 2022-11-30 | End: 2023-10-04 | Stop reason: SDUPTHER

## 2022-11-30 RX ORDER — BUPROPION HYDROCHLORIDE 150 MG/1
150 TABLET, EXTENDED RELEASE ORAL 2 TIMES DAILY
Qty: 180 TABLET | Refills: 3 | Status: SHIPPED | OUTPATIENT
Start: 2022-11-30 | End: 2023-08-03

## 2022-12-01 NOTE — PROGRESS NOTES
Virtual Visit: Established Patient   This visit was conducted via Zoom using secure and encrypted videoconferencing technology.   The patient was in their home in the [facility].    The patient's identity was confirmed and verbal consent was obtained for this virtual visit.     Subjective:   CC:   Chief Complaint   Patient presents with    Memory Loss         Patricio Lundberg is a 37 y.o. male presenting for evaluation and management of:  The patient is here for followup of chronic medical problems listed below. The patient is compliant with medications and having no side effects from them. Denies chest pain, abdominal pain, dyspnea, myalgias, or cough.   Patient Active Problem List   Diagnosis    Obesity (BMI 30-39.9)    Tobacco dependence    Mixed hyperlipidemia- mild no meds    Elevated liver enzymes- borderline; needs angela 3 mos; prob fatty liver    Excessive daytime sleepiness    Attention deficit disorder (ADD) without hyperactivity    Hypogonadism male    History of recent stressful life event- going through divorce    Obesity due to excess calories with serious comorbidity    Memory loss of unknown cause    Concussion with unknown loss of consciousness status    Contusion of head, subsequent encounter     11/30/2022          Outpatient Medications Prior to Visit   Medication Sig Dispense Refill    scopolamine (TRANSDERM-SCOP, 1.5 MG,) 1 mg/72hr PATCH 72 HR Place 1 Patch on the skin every 72 hours. 4 Patch 1    ondansetron (ZOFRAN) 4 MG Tab tablet Take 1 Tablet by mouth every four hours as needed for Nausea/Vomiting. 20 Tablet 1    simethicone (MYLICON) 125 MG chewable tablet Chew 2 Tablets one time.      bismuth subsalicylate (PEPTO-BISMOL) 262 MG/15ML Suspension Take 30 mL by mouth one time.      testosterone cypionate (DEPO-TESTOSTERONE) 200 MG/ML Solution injection Inject 1 mL into the shoulder, thigh, or buttocks every 14 days.      tizanidine (ZANAFLEX) 4 MG Tab Take 0.5-1 Tablets by mouth  every 6 hours as needed (pain and spasm). 30 Tablet 0     No facility-administered medications prior to visit.            ROS        Current medicines (including changes today)  Current Outpatient Medications   Medication Sig Dispense Refill    buPROPion SR (WELLBUTRIN-SR) 150 MG TABLET SR 12 HR sustained-release tablet Take 1 Tablet by mouth 2 times a day. 180 Tablet 3    testosterone cypionate (DEPO-TESTOSTERONE) 200 MG/ML Solution injection Inject 1 mL into the shoulder, thigh, or buttocks every 14 days for 84 days. 6 mL 1    scopolamine (TRANSDERM-SCOP, 1.5 MG,) 1 mg/72hr PATCH 72 HR Place 1 Patch on the skin every 72 hours. 4 Patch 1    ondansetron (ZOFRAN) 4 MG Tab tablet Take 1 Tablet by mouth every four hours as needed for Nausea/Vomiting. 20 Tablet 1     No current facility-administered medications for this visit.       Patient Active Problem List    Diagnosis Date Noted    Memory loss of unknown cause 11/30/2022    Concussion with unknown loss of consciousness status 11/30/2022    Contusion of head, subsequent encounter 11/30/2022    Hypogonadism male 07/26/2022    History of recent stressful life event- going through divorce 07/26/2022    Obesity due to excess calories with serious comorbidity 07/26/2022    Excessive daytime sleepiness 11/18/2019    Attention deficit disorder (ADD) without hyperactivity 11/18/2019    Mixed hyperlipidemia- mild no meds 04/23/2019    Elevated liver enzymes- borderline; needs angela 3 mos; prob fatty liver 04/23/2019    Tobacco dependence 02/19/2019    Obesity (BMI 30-39.9) 06/19/2018        Objective:   There were no vitals taken for this visit.    Physical Exam:          Constitutional: Alert, no distress, well-groomed.  Skin: No rashes in visible areas.  Eye: Round. Conjunctiva clear, lids normal. No icterus.   ENMT: Lips pink without lesions, good dentition, moist mucous membranes. Phonation normal.  Neck: No masses, no thyromegaly. Moves freely without pain.  Respiratory:  Unlabored respiratory effort, no cough or audible wheeze  Psych: Alert and oriented x3, normal affect and mood.   Admission on 11/01/2022, Discharged on 11/01/2022   Component Date Value    WBC 11/01/2022 14.8 (H)     RBC 11/01/2022 5.71     Hemoglobin 11/01/2022 17.1     Hematocrit 11/01/2022 51.5     MCV 11/01/2022 90.2     MCH 11/01/2022 29.9     MCHC 11/01/2022 33.2 (L)     RDW 11/01/2022 40.9     Platelet Count 11/01/2022 238     MPV 11/01/2022 10.1     Neutrophils-Polys 11/01/2022 72.20 (H)     Lymphocytes 11/01/2022 17.60 (L)     Monocytes 11/01/2022 6.00     Eosinophils 11/01/2022 3.30     Basophils 11/01/2022 0.50     Immature Granulocytes 11/01/2022 0.40     Nucleated RBC 11/01/2022 0.00     Neutrophils (Absolute) 11/01/2022 10.66 (H)     Lymphs (Absolute) 11/01/2022 2.60     Monos (Absolute) 11/01/2022 0.88 (H)     Eos (Absolute) 11/01/2022 0.48     Baso (Absolute) 11/01/2022 0.08     Immature Granulocytes (a* 11/01/2022 0.06     NRBC (Absolute) 11/01/2022 0.00     Sodium 11/01/2022 137     Potassium 11/01/2022 4.2     Chloride 11/01/2022 105     Co2 11/01/2022 25     Anion Gap 11/01/2022 7.0     Glucose 11/01/2022 113 (H)     Bun 11/01/2022 13     Creatinine 11/01/2022 0.87     Calcium 11/01/2022 8.7     AST(SGOT) 11/01/2022 22     ALT(SGPT) 11/01/2022 30     Alkaline Phosphatase 11/01/2022 68     Total Bilirubin 11/01/2022 0.2     Albumin 11/01/2022 4.3     Total Protein 11/01/2022 7.0     Globulin 11/01/2022 2.7     A-G Ratio 11/01/2022 1.6     Lipase 11/01/2022 59     Color 11/01/2022 Yellow     Character 11/01/2022 Clear     Specific Gravity 11/01/2022 >=1.045 (A)     Ph 11/01/2022 5.0     Glucose 11/01/2022 Negative     Ketones 11/01/2022 Negative     Protein 11/01/2022 Negative     Bilirubin 11/01/2022 Negative     Urobilinogen, Urine 11/01/2022 0.2     Nitrite 11/01/2022 Negative     Leukocyte Esterase 11/01/2022 Negative     Occult Blood 11/01/2022 Negative     Micro Urine Req 11/01/2022 see  below     GFR (CKD-EPI) 11/01/2022 112     Pathology Request 11/01/2022 Sent to [facility]       .alll  No results found for: HBA1C  Lab Results   Component Value Date/Time    SODIUM 137 11/01/2022 06:46 AM    POTASSIUM 4.2 11/01/2022 06:46 AM    CHLORIDE 105 11/01/2022 06:46 AM    CO2 25 11/01/2022 06:46 AM    GLUCOSE 113 (H) 11/01/2022 06:46 AM    BUN 13 11/01/2022 06:46 AM    CREATININE 0.87 11/01/2022 06:46 AM    CREATININE 1.0 09/27/2008 12:30 PM    ALKPHOSPHAT 68 11/01/2022 06:46 AM    ASTSGOT 22 11/01/2022 06:46 AM    ALTSGPT 30 11/01/2022 06:46 AM    TBILIRUBIN 0.2 11/01/2022 06:46 AM     No results found for: INR  Lab Results   Component Value Date/Time    CHOLSTRLTOT 206 (H) 04/25/2022 08:26 AM     (H) 04/25/2022 08:26 AM    HDL 36 (A) 04/25/2022 08:26 AM    TRIGLYCERIDE 143 04/25/2022 08:26 AM       Lab Results   Component Value Date/Time    TESTOSTERONE 333 04/25/2022 08:26 AM     No results found for: TSH  No results found for: FREET4  No results found for: URICACID  No components found for: VITB12  Lab Results   Component Value Date/Time    25HYDROXY 20 (L) 04/25/2022 08:26 AM     '         Assessment and Plan:   The following treatment plan was discussed:     1. Mixed hyperlipidemia- mild no meds    Under good control. Continue same regimen.  'Mediterranean diet and exercise  - CBC WITH DIFFERENTIAL; Future  - Lipid Profile; Future  - Comp Metabolic Panel; Future    2. Elevated liver enzymes- borderline; needs angela 3 mos; prob fatty liver  Under good control. Continue same regimen.  3. Attention deficit disorder (ADD) without hyperactivity  Under good control. Continue same regimen.  4. Hypogonadism maleUnder good control. Continue same regimen.  - testosterone cypionate (DEPO-TESTOSTERONE) 200 MG/ML Solution injection; Inject 1 mL into the shoulder, thigh, or buttocks every 14 days for 84 days.  Dispense: 6 mL; Refill: 1  - CBC WITH DIFFERENTIAL; Future  - PROSTATE SPECIFIC AG DIAGNOSTIC; Future  -  TESTOSTERONE, FREE AND TOTAL; Future    5. Class 1 obesity due to excess calories with serious comorbidity and body mass index (BMI) of 32.0 to 32.9 in adult   diet/exercise/lose 15 lbs.; patient counseled  '  6. Memory loss of unknown cause  Patient feels this is mild and only due to his ADHD and does not wish to pursue work-up such as MRI of the brain or referral to neurology at this time but will discuss on follow-up visit.  7. Concussion with unknown loss of consciousness status, subsequent encounter  This has resolved with no further headaches or confusion.  8. Contusion of head, subsequent encounter  This has resolved as stated above.  9. Tobacco dependence-not at goal.  Resume Wellbutrin.  - buPROPion SR (WELLBUTRIN-SR) 150 MG TABLET SR 12 HR sustained-release tablet; Take 1 Tablet by mouth 2 times a day.  Dispense: 180 Tablet; Refill: 3    10. Recurrent major depressive disorder, in partial remission (HCC)-not well controlled resume Wellbutrin  - buPROPion SR (WELLBUTRIN-SR) 150 MG TABLET SR 12 HR sustained-release tablet; Take 1 Tablet by mouth 2 times a day.  Dispense: 180 Tablet; Refill: 3      Follow-up: No follow-ups on file.

## 2023-01-08 ENCOUNTER — OFFICE VISIT (OUTPATIENT)
Dept: URGENT CARE | Facility: PHYSICIAN GROUP | Age: 38
End: 2023-01-08
Payer: COMMERCIAL

## 2023-01-08 VITALS
WEIGHT: 210 LBS | HEIGHT: 70 IN | HEART RATE: 84 BPM | DIASTOLIC BLOOD PRESSURE: 80 MMHG | BODY MASS INDEX: 30.06 KG/M2 | TEMPERATURE: 97.8 F | SYSTOLIC BLOOD PRESSURE: 132 MMHG | RESPIRATION RATE: 18 BRPM | OXYGEN SATURATION: 97 %

## 2023-01-08 DIAGNOSIS — B86 SCABIES: ICD-10-CM

## 2023-01-08 PROCEDURE — 99213 OFFICE O/P EST LOW 20 MIN: CPT | Performed by: FAMILY MEDICINE

## 2023-01-08 RX ORDER — PERMETHRIN 50 MG/G
1 CREAM TOPICAL ONCE
Qty: 60 G | Refills: 0 | Status: SHIPPED | OUTPATIENT
Start: 2023-01-08 | End: 2023-01-10 | Stop reason: SDUPTHER

## 2023-01-08 ASSESSMENT — FIBROSIS 4 INDEX: FIB4 SCORE: 0.66

## 2023-01-08 ASSESSMENT — ENCOUNTER SYMPTOMS
RESPIRATORY NEGATIVE: 1
CARDIOVASCULAR NEGATIVE: 1

## 2023-01-08 NOTE — PROGRESS NOTES
"Subjective     Patricio Lundberg is a 37 y.o. male who presents with Other (Possible scabies )            Other  Associated symptoms include a rash.     Review of Systems   HENT: Negative.     Respiratory: Negative.     Cardiovascular: Negative.    Skin:  Positive for rash.            Objective     /80   Pulse 84   Temp 36.6 °C (97.8 °F)   Resp 18   Ht 1.778 m (5' 10\")   Wt 95.3 kg (210 lb)   SpO2 97%   BMI 30.13 kg/m²      Physical Exam  Vitals and nursing note reviewed.   Constitutional:       Appearance: Normal appearance.   Skin:     Findings: Rash present.      Comments: Itchy raised rash in groin area.   Neurological:      Mental Status: He is alert.                           Assessment & Plan   1. Scabies    - permethrin (ELIMITE) 5 % Cream; Apply 1 Application topically one time for 1 dose.  Dispense: 60 g; Refill: 0                           "

## 2023-01-09 NOTE — TELEPHONE ENCOUNTER
Patient is on the MA Schedule today for Hep B vaccine/injection.    SPECIFIC Action To Be Taken: Orders pending, please sign.       Subjective:       Patient ID: Dorian Ho is a 42 y.o. female.    Chief Complaint:  preop clearance (D and C Hysteroscopy)      History of Present Illness  HPI  Atypical glandular cells on pap with negative cervical biopsies and negative ECC    Inadequate Embx attempt with only endocervical cells noted       GYN & OB History  Patient's last menstrual period was 2022 (exact date).   Date of Last Pap: 2022    OB History    Para Term  AB Living   3 2 2   1 2   SAB IAB Ectopic Multiple Live Births   1       2      # Outcome Date GA Lbr Bradley/2nd Weight Sex Delivery Anes PTL Lv   3 Term 2001 38w0d  3.714 kg (8 lb 3 oz) F  EPI N PRAVIN   2 Term 2000 42w0d  3.856 kg (8 lb 8 oz) M CS-Unspec EPI N PRAVIN      Complications: Failure to Progress in Second Stage   1 SAB              Past Medical History:   Diagnosis Date    Anemia     Anxiety disorder, unspecified      Past Surgical History:   Procedure Laterality Date     SECTION       Family History   Problem Relation Age of Onset    Diabetes Mother     Bipolar disorder Mother     No Known Problems Sister     Diabetes Maternal Grandmother     Cancer Maternal Uncle     Diabetes Paternal Uncle     Heart attack Maternal Grandfather     No Known Problems Paternal Grandfather     No Known Problems Paternal Grandmother      Social History     Tobacco Use    Smoking status: Never    Smokeless tobacco: Never    Tobacco comments:     Quit 10- 15 years ago h/o 1 cigarette a day    Substance Use Topics    Alcohol use: Never     Comment: rarely    Drug use: Never     (Not in a hospital admission)    Review of patient's allergies indicates:   Allergen Reactions    Codeine Hallucinations    Penicillins      Current Outpatient Medications   Medication Sig    ibuprofen (ADVIL,MOTRIN) 800 MG tablet ibuprofen Take 10am No date recorded No form recorded No frequency recorded No route recorded No set duration recorded No set duration amount recorded suspended  No dosage strength recorded No dosage strength units of measure recorded    miSOPROStoL (CYTOTEC) 200 MCG Tab Take 1 tablet (200 mcg total) by mouth once. for 1 dose     No current facility-administered medications for this visit.      Review of Systems  Review of Systems   Constitutional:  Negative for appetite change, chills, fatigue, fever and unexpected weight change.   HENT: Negative.     Eyes:  Negative for visual disturbance.   Respiratory:  Negative for shortness of breath and wheezing.    Cardiovascular:  Negative for chest pain, palpitations and leg swelling.   Gastrointestinal:  Negative for abdominal pain, bloating, blood in stool, constipation, diarrhea, nausea and vomiting.   Endocrine: Negative for hair loss, hot flashes and hypothyroidism.   Genitourinary:  Negative for dysmenorrhea, dyspareunia, dysuria, flank pain, frequency, genital sores, hematuria, menorrhagia, menstrual problem, pelvic pain, urgency, vaginal bleeding, vaginal discharge, urinary incontinence and vaginal odor.   Musculoskeletal:  Negative for back pain, joint swelling and myalgias.   Integumentary:  Negative for rash, hair changes, breast mass, nipple discharge and breast skin changes.   Neurological:  Negative for syncope and headaches.   Hematological:  Negative for adenopathy. Does not bruise/bleed easily.   Psychiatric/Behavioral:  Negative for depression and sleep disturbance. The patient is not nervous/anxious.    Breast: Negative for mass, mastodynia, nipple discharge and skin changes        Objective:    Physical Exam:   Constitutional: She is oriented to person, place, and time. Vital signs are normal. She appears well-developed and well-nourished. No distress.    HENT:   Head: Normocephalic and atraumatic.   Right Ear: External ear normal.   Left Ear: External ear normal.   Nose: Nose normal.    Eyes: Pupils are equal, round, and reactive to light. Conjunctivae are normal.    Neck: Trachea normal. No JVD present. No  thyroid mass and no thyromegaly present.    Cardiovascular:  Normal rate and regular rhythm.             Pulmonary/Chest: Effort normal and breath sounds normal. No respiratory distress.        Abdominal: Soft. Bowel sounds are normal. She exhibits no distension and no mass. There is no abdominal tenderness. No hernia. Hernia confirmed negative in the ventral area, confirmed negative in the right inguinal area and confirmed negative in the left inguinal area.     Genitourinary:    Vagina, uterus and rectum normal.   Rectum:      No external hemorrhoid or abnormal anal tone.   Labial bartholins normal.There is no rash, tenderness or lesion on the right labia. There is no rash, tenderness or lesion on the left labia. Cervix is normal. Right adnexum displays no mass, no tenderness and no fullness. Left adnexum displays no mass, no tenderness and no fullness. No  no vaginal discharge, tenderness, bleeding, rectocele, cystocele or unspecified prolapse of vaginal walls in the vagina.    No foreign body in the vagina.   Cervix exhibits no motion tenderness, no discharge and no friability. Uterus is not deviated, not enlarged and not tender.           Musculoskeletal: Normal range of motion.       Neurological: She is alert and oriented to person, place, and time. She has normal reflexes.    Skin: Skin is warm and dry. She is not diaphoretic.    Psychiatric: She has a normal mood and affect. Her speech is normal and behavior is normal. Thought content normal.        Assessment:        1. Atypical glandular cells of undetermined significance (QUINTEN) on cervical Pap smear    2. Cervical stenosis (uterine cervix)                Plan:      Dorian was seen today for preop clearance.    Diagnoses and all orders for this visit:    Atypical glandular cells of undetermined significance (QUINTEN) on cervical Pap smear  Comments:  Hysteroscopy with curettage     Cervical stenosis (uterine cervix)  -     miSOPROStoL (CYTOTEC) 200 MCG Tab;  Take 1 tablet (200 mcg total) by mouth once. for 1 dose

## 2023-01-10 DIAGNOSIS — B86 SCABIES: ICD-10-CM

## 2023-01-10 RX ORDER — PERMETHRIN 50 MG/G
1 CREAM TOPICAL ONCE
Qty: 60 G | Refills: 0 | Status: SHIPPED | OUTPATIENT
Start: 2023-01-10 | End: 2023-01-10

## 2023-01-17 ENCOUNTER — OFFICE VISIT (OUTPATIENT)
Dept: DERMATOLOGY | Facility: IMAGING CENTER | Age: 38
End: 2023-01-17
Payer: COMMERCIAL

## 2023-01-17 DIAGNOSIS — L29.9 PRURITUS: ICD-10-CM

## 2023-01-17 PROCEDURE — 99212 OFFICE O/P EST SF 10 MIN: CPT | Performed by: NURSE PRACTITIONER

## 2023-01-17 NOTE — PROGRESS NOTES
DERMATOLOGY NOTE  NEW VISIT       Chief complaint: Establish Care and Rash     HPI:All over  Onset: Approx 1mo  Symptoms: itchy  Aggravating factors: No  Alleviating factors: Cysol   New creams/topicals: No  New medications (up to last 6 months): No  New travel: [facility] cruise   Other exposures: No  Treatments: Permethin cream 5% at [facility] urgent care             Allergies   Allergen Reactions    Toradol Swelling     Per patient he felt like his throat was swelling shut.  After 10 minutes the sensation resolved        MEDICATIONS:  Medications relevant to specialty reviewed.     REVIEW OF SYSTEMS:   Positive for skin (see HPI)  Negative for fevers and chills       EXAM:  There were no vitals taken for this visit.  Constitutional: Well-developed, well-nourished, and in no distress.     A focused skin exam was performed including the affected areas of the bilateral upper extremities. Notable findings on exam today listed below and/or in assessment/plan.     No discernable rash or welts present on exam, pt provided pics on phone--appears to be urticaria on abdomen and lower extremity as well as evidence of dermatographism    IMPRESSION / PLAN:    1. Pruritus, unspecified cause--suspicious for urticaria  Was treated for scabies with 2 rounds of permethrin cream, but not convinced of this diagnosis as pt symptoms have improved with use of Xyzal  Could be isolated incident as this occurred during a cruise, but also consider pt developing chronic urticaria  Advised to continue with second or third generation antihistamine use, advised to increase to 2-3 times daily if needed  Stressed importance of emollient use, dry skin handouts given  Follow up if needed, if no improvement with above interventions, may need to consider referral to allergist      Patient verbalized understanding and agrees with plan regarding the above        Please note that this dictation was created using voice recognition software. I have made  every reasonable attempt to correct obvious errors, but I expect that there are errors of grammar and possibly content that I did not discover before finalizing the note.      Return to clinic in: Return for PRN. and as needed for any new or changing skin lesions.

## 2023-08-03 ENCOUNTER — OFFICE VISIT (OUTPATIENT)
Dept: MEDICAL GROUP | Age: 38
End: 2023-08-03
Payer: COMMERCIAL

## 2023-08-03 ENCOUNTER — HOSPITAL ENCOUNTER (OUTPATIENT)
Dept: LAB | Facility: MEDICAL CENTER | Age: 38
End: 2023-08-03
Attending: INTERNAL MEDICINE
Payer: COMMERCIAL

## 2023-08-03 VITALS
OXYGEN SATURATION: 96 % | HEART RATE: 79 BPM | SYSTOLIC BLOOD PRESSURE: 118 MMHG | HEIGHT: 70 IN | TEMPERATURE: 96.9 F | DIASTOLIC BLOOD PRESSURE: 82 MMHG | WEIGHT: 240 LBS | BODY MASS INDEX: 34.36 KG/M2

## 2023-08-03 DIAGNOSIS — E55.9 VITAMIN D DEFICIENCY: ICD-10-CM

## 2023-08-03 DIAGNOSIS — K76.0 FATTY LIVER: ICD-10-CM

## 2023-08-03 DIAGNOSIS — R73.01 IFG (IMPAIRED FASTING GLUCOSE): ICD-10-CM

## 2023-08-03 DIAGNOSIS — E78.2 MIXED HYPERLIPIDEMIA: ICD-10-CM

## 2023-08-03 DIAGNOSIS — E29.1 HYPOGONADISM MALE: ICD-10-CM

## 2023-08-03 DIAGNOSIS — R10.11 RUQ ABDOMINAL PAIN: ICD-10-CM

## 2023-08-03 DIAGNOSIS — R76.11 POSITIVE PPD: ICD-10-CM

## 2023-08-03 LAB
25(OH)D3 SERPL-MCNC: 26 NG/ML (ref 30–100)
ALBUMIN SERPL BCP-MCNC: 4.5 G/DL (ref 3.2–4.9)
ALBUMIN/GLOB SERPL: 1.4 G/DL
ALP SERPL-CCNC: 66 U/L (ref 30–99)
ALT SERPL-CCNC: 44 U/L (ref 2–50)
ANION GAP SERPL CALC-SCNC: 11 MMOL/L (ref 7–16)
APPEARANCE UR: CLEAR
AST SERPL-CCNC: 16 U/L (ref 12–45)
BASOPHILS # BLD AUTO: 1.1 % (ref 0–1.8)
BASOPHILS # BLD: 0.1 K/UL (ref 0–0.12)
BILIRUB SERPL-MCNC: 0.2 MG/DL (ref 0.1–1.5)
BILIRUB UR QL STRIP.AUTO: NEGATIVE
BUN SERPL-MCNC: 18 MG/DL (ref 8–22)
CALCIUM ALBUM COR SERPL-MCNC: 8.8 MG/DL (ref 8.5–10.5)
CALCIUM SERPL-MCNC: 9.2 MG/DL (ref 8.5–10.5)
CHLORIDE SERPL-SCNC: 104 MMOL/L (ref 96–112)
CHOLEST SERPL-MCNC: 195 MG/DL (ref 100–199)
CO2 SERPL-SCNC: 24 MMOL/L (ref 20–33)
COLOR UR: YELLOW
CREAT SERPL-MCNC: 0.98 MG/DL (ref 0.5–1.4)
EOSINOPHIL # BLD AUTO: 0.42 K/UL (ref 0–0.51)
EOSINOPHIL NFR BLD: 4.5 % (ref 0–6.9)
ERYTHROCYTE [DISTWIDTH] IN BLOOD BY AUTOMATED COUNT: 40.2 FL (ref 35.9–50)
EST. AVERAGE GLUCOSE BLD GHB EST-MCNC: 117 MG/DL
FASTING STATUS PATIENT QL REPORTED: NORMAL
GFR SERPLBLD CREATININE-BSD FMLA CKD-EPI: 100 ML/MIN/1.73 M 2
GLOBULIN SER CALC-MCNC: 3.3 G/DL (ref 1.9–3.5)
GLUCOSE SERPL-MCNC: 92 MG/DL (ref 65–99)
GLUCOSE UR STRIP.AUTO-MCNC: NEGATIVE MG/DL
HBA1C MFR BLD: 5.7 % (ref 4–5.6)
HCT VFR BLD AUTO: 50.3 % (ref 42–52)
HDLC SERPL-MCNC: 36 MG/DL
HGB BLD-MCNC: 16.7 G/DL (ref 14–18)
IMM GRANULOCYTES # BLD AUTO: 0.03 K/UL (ref 0–0.11)
IMM GRANULOCYTES NFR BLD AUTO: 0.3 % (ref 0–0.9)
KETONES UR STRIP.AUTO-MCNC: NEGATIVE MG/DL
LDLC SERPL CALC-MCNC: 132 MG/DL
LEUKOCYTE ESTERASE UR QL STRIP.AUTO: NEGATIVE
LYMPHOCYTES # BLD AUTO: 3.55 K/UL (ref 1–4.8)
LYMPHOCYTES NFR BLD: 37.9 % (ref 22–41)
MCH RBC QN AUTO: 29.6 PG (ref 27–33)
MCHC RBC AUTO-ENTMCNC: 33.2 G/DL (ref 32.3–36.5)
MCV RBC AUTO: 89.2 FL (ref 81.4–97.8)
MICRO URNS: NORMAL
MONOCYTES # BLD AUTO: 0.84 K/UL (ref 0–0.85)
MONOCYTES NFR BLD AUTO: 9 % (ref 0–13.4)
NEUTROPHILS # BLD AUTO: 4.43 K/UL (ref 1.82–7.42)
NEUTROPHILS NFR BLD: 47.2 % (ref 44–72)
NITRITE UR QL STRIP.AUTO: NEGATIVE
NRBC # BLD AUTO: 0 K/UL
NRBC BLD-RTO: 0 /100 WBC (ref 0–0.2)
PH UR STRIP.AUTO: 5.5 [PH] (ref 5–8)
PLATELET # BLD AUTO: 241 K/UL (ref 164–446)
PMV BLD AUTO: 10.4 FL (ref 9–12.9)
POTASSIUM SERPL-SCNC: 4.2 MMOL/L (ref 3.6–5.5)
PROT SERPL-MCNC: 7.8 G/DL (ref 6–8.2)
PROT UR QL STRIP: NEGATIVE MG/DL
PSA SERPL-MCNC: 0.62 NG/ML (ref 0–4)
RBC # BLD AUTO: 5.64 M/UL (ref 4.7–6.1)
RBC UR QL AUTO: NEGATIVE
SODIUM SERPL-SCNC: 139 MMOL/L (ref 135–145)
SP GR UR STRIP.AUTO: 1.02
TRIGL SERPL-MCNC: 137 MG/DL (ref 0–149)
TSH SERPL DL<=0.005 MIU/L-ACNC: 2.6 UIU/ML (ref 0.38–5.33)
UROBILINOGEN UR STRIP.AUTO-MCNC: 1 MG/DL
WBC # BLD AUTO: 9.4 K/UL (ref 4.8–10.8)

## 2023-08-03 PROCEDURE — 3074F SYST BP LT 130 MM HG: CPT | Performed by: INTERNAL MEDICINE

## 2023-08-03 PROCEDURE — 84443 ASSAY THYROID STIM HORMONE: CPT

## 2023-08-03 PROCEDURE — 84402 ASSAY OF FREE TESTOSTERONE: CPT

## 2023-08-03 PROCEDURE — 85025 COMPLETE CBC W/AUTO DIFF WBC: CPT

## 2023-08-03 PROCEDURE — 83036 HEMOGLOBIN GLYCOSYLATED A1C: CPT

## 2023-08-03 PROCEDURE — 80053 COMPREHEN METABOLIC PANEL: CPT

## 2023-08-03 PROCEDURE — 84153 ASSAY OF PSA TOTAL: CPT

## 2023-08-03 PROCEDURE — 80061 LIPID PANEL: CPT

## 2023-08-03 PROCEDURE — 99214 OFFICE O/P EST MOD 30 MIN: CPT | Performed by: INTERNAL MEDICINE

## 2023-08-03 PROCEDURE — 84270 ASSAY OF SEX HORMONE GLOBUL: CPT

## 2023-08-03 PROCEDURE — 36415 COLL VENOUS BLD VENIPUNCTURE: CPT

## 2023-08-03 PROCEDURE — 82306 VITAMIN D 25 HYDROXY: CPT

## 2023-08-03 PROCEDURE — 3079F DIAST BP 80-89 MM HG: CPT | Performed by: INTERNAL MEDICINE

## 2023-08-03 PROCEDURE — 84403 ASSAY OF TOTAL TESTOSTERONE: CPT

## 2023-08-03 PROCEDURE — 86480 TB TEST CELL IMMUN MEASURE: CPT

## 2023-08-03 PROCEDURE — 81003 URINALYSIS AUTO W/O SCOPE: CPT

## 2023-08-03 RX ORDER — TESTOSTERONE CYPIONATE 200 MG/ML
200 INJECTION, SOLUTION INTRAMUSCULAR
Qty: 6 ML | Refills: 1 | Status: CANCELLED | OUTPATIENT
Start: 2023-08-03 | End: 2023-10-26

## 2023-08-03 ASSESSMENT — FIBROSIS 4 INDEX: FIB4 SCORE: 0.66

## 2023-08-03 ASSESSMENT — PATIENT HEALTH QUESTIONNAIRE - PHQ9: CLINICAL INTERPRETATION OF PHQ2 SCORE: 0

## 2023-08-04 LAB
GAMMA INTERFERON BACKGROUND BLD IA-ACNC: 0.04 IU/ML
M TB IFN-G BLD-IMP: NEGATIVE
M TB IFN-G CD4+ BCKGRND COR BLD-ACNC: 0 IU/ML
MITOGEN IGNF BCKGRD COR BLD-ACNC: >10 IU/ML
QFT TB2 - NIL TBQ2: 0 IU/ML
SHBG SERPL-SCNC: 22 NMOL/L (ref 17–56)
TESTOST FREE MFR SERPL: 2.2 % (ref 1.6–2.9)
TESTOST FREE SERPL-MCNC: 58 PG/ML (ref 47–244)
TESTOST SERPL-MCNC: 269 NG/DL (ref 300–1080)

## 2023-08-08 NOTE — PROGRESS NOTES
Subjective:     Chief Complaint   Patient presents with    Follow-Up     Right upper quadrante pain has a fatty liver      Patricio Lundberg is a 37 y.o. male who had concerns including Follow-Up (Right upper quadrante pain has a fatty liver )..    He is here for follow-up and management of the chronic medical problems listed below.   Once follow-up of his fatty liver.  And also needs chronic from [facility] does not want x-rays done of his chest and he had a positive PPD.  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    ROS    Denies chest pain, abdominal pain, dyspnea, myalgias, or cough.    Allergies   Allergen Reactions    Toradol Swelling     Per patient he felt like his throat was swelling shut.  After 10 minutes the sensation resolved       Current medicines:  Outpatient Medications Prior to Visit   Medication Sig Dispense Refill    buPROPion SR (WELLBUTRIN-SR) 150 MG TABLET SR 12 HR sustained-release tablet Take 1 Tablet by mouth 2 times a day. 180 Tablet 3    scopolamine (TRANSDERM-SCOP, 1.5 MG,) 1 mg/72hr PATCH 72 HR Place 1 Patch on the skin every 72 hours. (Patient not taking: Reported on 1/8/2023) 4 Patch 1    ondansetron (ZOFRAN) 4 MG Tab tablet Take 1 Tablet by mouth every four hours as needed for Nausea/Vomiting. (Patient not taking: Reported on 1/8/2023) 20 Tablet 1     No facility-administered medications prior to visit.       Patient Active Problem List    Diagnosis Date Noted    Memory loss of unknown cause 11/30/2022    Concussion with unknown loss of consciousness status 11/30/2022    Contusion of head, subsequent encounter 11/30/2022    Hypogonadism male 07/26/2022    History of recent stressful life event- going through divorce 07/26/2022    Obesity due to excess calories with serious comorbidity 07/26/2022    Excessive daytime sleepiness 11/18/2019    Attention deficit disorder (ADD) without hyperactivity 11/18/2019    Mixed hyperlipidemia- mild no meds 04/23/2019    Elevated  "liver enzymes- borderline; needs angela 3 mos; prob fatty liver 04/23/2019    Tobacco dependence 02/19/2019    Obesity (BMI 30-39.9) 06/19/2018        Objective:     /82 (BP Location: Left arm, Patient Position: Sitting, BP Cuff Size: Adult)   Pulse 79   Temp 36.1 °C (96.9 °F) (Temporal)   Ht 1.778 m (5' 10\")   Wt 109 kg (240 lb)   SpO2 96%  Body mass index is 34.44 kg/m².      Physical Exam:  Constitutional: He is oriented to person, place, and time. He appears well-developed and well-nourished. No distress.   HENT:  Head:  Normocephalic and atraumatic.  Right Ear: External ear normal.  Left Ear: External ear normal.  Eyes: Conjunctivae and EOM are normal. Pupils are equal, round, and reactive to light. No discharge.   Neck: Normal range of motion. Neck supple. No JVD present. No tracheal deviation present. No thyromegaly present.   Cardiovascular: Normal rate, regular rhythm, normal heart sounds and intact distal pulses. Exam reveals no gallop.  Pulmonary/Chest: Effort normal and breath sounds normal. No respiratory distress. He has no wheezes. He has no rales. He exhibits no tenderness.   Abdominal: Soft. Bowel sounds are normal. He exhibits no distension and no mass. He has no tenderness. There is no rebound and no guarding. No hernia.   Musculoskeletal: He exhibits no edema or tenderness.   Lymphadenopathy : He has no cervical adenopathy.  Neurological: He is alert and oriented to person, place, and time. He has normal reflexes. No cranial nerve deficit. He exhibits normal muscle tone. Coordination is normal.   Skin: Skin is warm and dry. No rash noted. He is not diaphoretic. No erythema. No pallor.  Psychiatric: He has a normal mood and affect. His behavior is normal. Judgement and thought content are normal.  Nursing note and vitals reviewed.    No visits with results within 1 Month(s) from this visit.   Latest known visit with results is:   Admission on 11/01/2022, Discharged on 11/01/2022 "   Component Date Value    WBC 11/01/2022 14.8 (H)     RBC 11/01/2022 5.71     Hemoglobin 11/01/2022 17.1     Hematocrit 11/01/2022 51.5     MCV 11/01/2022 90.2     MCH 11/01/2022 29.9     MCHC 11/01/2022 33.2 (L)     RDW 11/01/2022 40.9     Platelet Count 11/01/2022 238     MPV 11/01/2022 10.1     Neutrophils-Polys 11/01/2022 72.20 (H)     Lymphocytes 11/01/2022 17.60 (L)     Monocytes 11/01/2022 6.00     Eosinophils 11/01/2022 3.30     Basophils 11/01/2022 0.50     Immature Granulocytes 11/01/2022 0.40     Nucleated RBC 11/01/2022 0.00     Neutrophils (Absolute) 11/01/2022 10.66 (H)     Lymphs (Absolute) 11/01/2022 2.60     Monos (Absolute) 11/01/2022 0.88 (H)     Eos (Absolute) 11/01/2022 0.48     Baso (Absolute) 11/01/2022 0.08     Immature Granulocytes (a* 11/01/2022 0.06     NRBC (Absolute) 11/01/2022 0.00     Sodium 11/01/2022 137     Potassium 11/01/2022 4.2     Chloride 11/01/2022 105     Co2 11/01/2022 25     Anion Gap 11/01/2022 7.0     Glucose 11/01/2022 113 (H)     Bun 11/01/2022 13     Creatinine 11/01/2022 0.87     Calcium 11/01/2022 8.7     AST(SGOT) 11/01/2022 22     ALT(SGPT) 11/01/2022 30     Alkaline Phosphatase 11/01/2022 68     Total Bilirubin 11/01/2022 0.2     Albumin 11/01/2022 4.3     Total Protein 11/01/2022 7.0     Globulin 11/01/2022 2.7     A-G Ratio 11/01/2022 1.6     Lipase 11/01/2022 59     Color 11/01/2022 Yellow     Character 11/01/2022 Clear     Specific Gravity 11/01/2022 >=1.045 (A)     Ph 11/01/2022 5.0     Glucose 11/01/2022 Negative     Ketones 11/01/2022 Negative     Protein 11/01/2022 Negative     Bilirubin 11/01/2022 Negative     Urobilinogen, Urine 11/01/2022 0.2     Nitrite 11/01/2022 Negative     Leukocyte Esterase 11/01/2022 Negative     Occult Blood 11/01/2022 Negative     Micro Urine Req 11/01/2022 see below     GFR (CKD-EPI) 11/01/2022 112     Pathology Request 11/01/2022 Sent to [facility]       Lab Results   Component Value Date/Time    HBA1C 5.7 (H) 08/03/2023 07:48 AM      Lab Results   Component Value Date/Time    SODIUM 139 08/03/2023 07:48 AM    POTASSIUM 4.2 08/03/2023 07:48 AM    CHLORIDE 104 08/03/2023 07:48 AM    CO2 24 08/03/2023 07:48 AM    GLUCOSE 92 08/03/2023 07:48 AM    BUN 18 08/03/2023 07:48 AM    CREATININE 0.98 08/03/2023 07:48 AM    CREATININE 1.0 09/27/2008 12:30 PM    ALKPHOSPHAT 66 08/03/2023 07:48 AM    ASTSGOT 16 08/03/2023 07:48 AM    ALTSGPT 44 08/03/2023 07:48 AM    TBILIRUBIN 0.2 08/03/2023 07:48 AM     No results found for: INR  Lab Results   Component Value Date/Time    CHOLSTRLTOT 195 08/03/2023 07:48 AM     (H) 08/03/2023 07:48 AM    HDL 36 (A) 08/03/2023 07:48 AM    TRIGLYCERIDE 137 08/03/2023 07:48 AM       Lab Results   Component Value Date/Time    TESTOSTERONE 269 (L) 08/03/2023 07:48 AM     No results found for: TSH  No results found for: FREET4  No results found for: URICACID  No components found for: VITB12  Lab Results   Component Value Date/Time    25HYDROXY 26 (L) 08/03/2023 07:48 AM    25HYDROXY 20 (L) 04/25/2022 08:26 AM            Assessment and Plan:   The following treatment plan was discussed:       1. Hypogonadism male  Has some mild loss of libido and loss of energy.  May be due to the testosterone deficient.  Check levels.  Call results.  Treat with testosterone if appropriately indicated and if his levels are low.  - CBC WITH DIFFERENTIAL; Future  - TESTOSTERONE, FREE AND TOTAL; Future  - PROSTATE SPECIFIC AG DIAGNOSTIC; Future    2. Positive PPD     - Quantiferon Gold Plus TB (4 tube); Future-this came back negative.  He will not need any further testing such as skin testing or chest x-rays.  He is not contagious.    3. RUQ abdominal pain  Unclear etiology.  Rule out gallbladder disease.  Ultrasound ordered  - Comp Metabolic Panel; Future  - CBC WITH DIFFERENTIAL; Future  - US-ABDOMEN COMPLETE SURVEY; Future  - URINALYSIS,CULTURE IF INDICATED; Future  - Referral to Gastroenterology    4. Fatty liver  Mediterranean diet and  exercise.  - Comp Metabolic Panel; Future  - Referral to Gastroenterology    5. Mixed hyperlipidemia- mild no meds  Mediterranean diet and exercise.  - Comp Metabolic Panel; Future  - CBC WITH DIFFERENTIAL; Future  - TSH; Future  - Lipid Profile; Future    6. IFG (impaired fasting glucose)  Maintain diet and exercise  - HEMOGLOBIN A1C; Future    7. Vitamin D deficiency  Vitamin D 2000 units daily.  - VITAMIN D,25 HYDROXY (DEFICIENCY); Future      Followup: No follow-ups on file.

## 2023-08-10 ENCOUNTER — HOSPITAL ENCOUNTER (OUTPATIENT)
Dept: RADIOLOGY | Facility: MEDICAL CENTER | Age: 38
End: 2023-08-10
Attending: INTERNAL MEDICINE
Payer: COMMERCIAL

## 2023-08-10 DIAGNOSIS — R10.11 RUQ ABDOMINAL PAIN: ICD-10-CM

## 2023-08-10 PROCEDURE — 76700 US EXAM ABDOM COMPLETE: CPT

## 2023-09-07 ENCOUNTER — OFFICE VISIT (OUTPATIENT)
Dept: MEDICAL GROUP | Age: 38
End: 2023-09-07
Payer: COMMERCIAL

## 2023-09-07 VITALS
DIASTOLIC BLOOD PRESSURE: 62 MMHG | BODY MASS INDEX: 33.21 KG/M2 | OXYGEN SATURATION: 96 % | HEIGHT: 70 IN | TEMPERATURE: 98.5 F | SYSTOLIC BLOOD PRESSURE: 124 MMHG | HEART RATE: 91 BPM | WEIGHT: 232 LBS

## 2023-09-07 DIAGNOSIS — E66.09 CLASS 1 OBESITY DUE TO EXCESS CALORIES WITH SERIOUS COMORBIDITY AND BODY MASS INDEX (BMI) OF 33.0 TO 33.9 IN ADULT: ICD-10-CM

## 2023-09-07 DIAGNOSIS — R74.8 ELEVATED LIVER ENZYMES: ICD-10-CM

## 2023-09-07 DIAGNOSIS — E55.9 VITAMIN D DEFICIENCY: ICD-10-CM

## 2023-09-07 DIAGNOSIS — E29.1 HYPOGONADISM MALE: ICD-10-CM

## 2023-09-07 DIAGNOSIS — R73.01 IFG (IMPAIRED FASTING GLUCOSE): ICD-10-CM

## 2023-09-07 DIAGNOSIS — E78.2 MIXED HYPERLIPIDEMIA: ICD-10-CM

## 2023-09-07 PROBLEM — S06.0XAA CONCUSSION WITH UNKNOWN LOSS OF CONSCIOUSNESS STATUS: Status: RESOLVED | Noted: 2022-11-30 | Resolved: 2023-09-07

## 2023-09-07 PROBLEM — R41.3 MEMORY LOSS OF UNKNOWN CAUSE: Status: RESOLVED | Noted: 2022-11-30 | Resolved: 2023-09-07

## 2023-09-07 PROBLEM — S00.93XD: Status: RESOLVED | Noted: 2022-11-30 | Resolved: 2023-09-07

## 2023-09-07 PROCEDURE — 3078F DIAST BP <80 MM HG: CPT | Performed by: INTERNAL MEDICINE

## 2023-09-07 PROCEDURE — 3074F SYST BP LT 130 MM HG: CPT | Performed by: INTERNAL MEDICINE

## 2023-09-07 PROCEDURE — 99214 OFFICE O/P EST MOD 30 MIN: CPT | Performed by: INTERNAL MEDICINE

## 2023-09-07 ASSESSMENT — ENCOUNTER SYMPTOMS
EYES NEGATIVE: 1
RESPIRATORY NEGATIVE: 1
PSYCHIATRIC NEGATIVE: 1
CARDIOVASCULAR NEGATIVE: 1
CONSTITUTIONAL NEGATIVE: 1
GASTROINTESTINAL NEGATIVE: 1
MUSCULOSKELETAL NEGATIVE: 1
NEUROLOGICAL NEGATIVE: 1

## 2023-09-07 ASSESSMENT — FIBROSIS 4 INDEX: FIB4 SCORE: 0.39

## 2023-09-07 NOTE — PROGRESS NOTES
Subjective     Patricio Lundberg is a 37 y.o. male who presents with Follow-Up (Ultra sound and lab review )  The patient is here for followup of chronic medical problems listed below. The patient is compliant with medications and having no side effects from them. Denies chest pain, abdominal pain, dyspnea, myalgias, or cough.   Patient Active Problem List   Diagnosis    Obesity (BMI 30-39.9)    Tobacco dependence    Mixed hyperlipidemia- mild no meds    Elevated liver enzymes- borderline; needs angela 3 mos; prob fatty liver    Excessive daytime sleepiness    Attention deficit disorder (ADD) without hyperactivity    Hypogonadism male    History of recent stressful life event- going through divorce    Obesity due to excess calories with serious comorbidity     No outpatient medications prior to visit.     No facility-administered medications prior to visit.     No visits with results within 1 Month(s) from this visit.   Latest known visit with results is:   Hospital Outpatient Visit on 08/03/2023   Component Date Value    Color 08/03/2023 Yellow     Character 08/03/2023 Clear     Specific Gravity 08/03/2023 1.022     Ph 08/03/2023 5.5     Glucose 08/03/2023 Negative     Ketones 08/03/2023 Negative     Protein 08/03/2023 Negative     Bilirubin 08/03/2023 Negative     Urobilinogen, Urine 08/03/2023 1.0     Nitrite 08/03/2023 Negative     Leukocyte Esterase 08/03/2023 Negative     Occult Blood 08/03/2023 Negative     Micro Urine Req 08/03/2023 see below     Prostatic Specific Antig* 08/03/2023 0.62     25-Hydroxy   Vitamin D 25 08/03/2023 26 (L)     Cholesterol,Tot 08/03/2023 195     Triglycerides 08/03/2023 137     HDL 08/03/2023 36 (A)     LDL 08/03/2023 132 (H)     Glycohemoglobin 08/03/2023 5.7 (H)     Est Avg Glucose 08/03/2023 117     TSH 08/03/2023 2.600     WBC 08/03/2023 9.4     RBC 08/03/2023 5.64     Hemoglobin 08/03/2023 16.7     Hematocrit 08/03/2023 50.3     MCV 08/03/2023 89.2     MCH 08/03/2023 29.6      MCHC 08/03/2023 33.2     RDW 08/03/2023 40.2     Platelet Count 08/03/2023 241     MPV 08/03/2023 10.4     Neutrophils-Polys 08/03/2023 47.20     Lymphocytes 08/03/2023 37.90     Monocytes 08/03/2023 9.00     Eosinophils 08/03/2023 4.50     Basophils 08/03/2023 1.10     Immature Granulocytes 08/03/2023 0.30     Nucleated RBC 08/03/2023 0.00     Neutrophils (Absolute) 08/03/2023 4.43     Lymphs (Absolute) 08/03/2023 3.55     Monos (Absolute) 08/03/2023 0.84     Eos (Absolute) 08/03/2023 0.42     Baso (Absolute) 08/03/2023 0.10     Immature Granulocytes (a* 08/03/2023 0.03     NRBC (Absolute) 08/03/2023 0.00     Sodium 08/03/2023 139     Potassium 08/03/2023 4.2     Chloride 08/03/2023 104     Co2 08/03/2023 24     Anion Gap 08/03/2023 11.0     Glucose 08/03/2023 92     Bun 08/03/2023 18     Creatinine 08/03/2023 0.98     Calcium 08/03/2023 9.2     Correct Calcium 08/03/2023 8.8     AST(SGOT) 08/03/2023 16     ALT(SGPT) 08/03/2023 44     Alkaline Phosphatase 08/03/2023 66     Total Bilirubin 08/03/2023 0.2     Albumin 08/03/2023 4.5     Total Protein 08/03/2023 7.8     Globulin 08/03/2023 3.3     A-G Ratio 08/03/2023 1.4     QFT NIL 08/03/2023 0.04     QFT TB1 - NIL 08/03/2023 0.00     QFT TB2 - NIL 08/03/2023 0.00     QFT Mitogen - NIL 08/03/2023 >10.00     QFT Gold Plus 08/03/2023 Negative     Testosterone,Total 08/03/2023 269 (L)     Sex Hormone Bind Globulin 08/03/2023 22     Free Testosterone 08/03/2023 58     Testosterone % Free 08/03/2023 2.2     Fasting Status 08/03/2023 Fasting     GFR (CKD-EPI) 08/03/2023 100     .  Alll  '  Lab Results   Component Value Date/Time    HBA1C 5.7 (H) 08/03/2023 07:48 AM     Lab Results   Component Value Date/Time    SODIUM 139 08/03/2023 07:48 AM    POTASSIUM 4.2 08/03/2023 07:48 AM    CHLORIDE 104 08/03/2023 07:48 AM    CO2 24 08/03/2023 07:48 AM    GLUCOSE 92 08/03/2023 07:48 AM    BUN 18 08/03/2023 07:48 AM    CREATININE 0.98 08/03/2023 07:48 AM    CREATININE 1.0  "09/27/2008 12:30 PM    ALKPHOSPHAT 66 08/03/2023 07:48 AM    ASTSGOT 16 08/03/2023 07:48 AM    ALTSGPT 44 08/03/2023 07:48 AM    TBILIRUBIN 0.2 08/03/2023 07:48 AM     No results found for: \"INR\"  Lab Results   Component Value Date/Time    CHOLSTRLTOT 195 08/03/2023 07:48 AM     (H) 08/03/2023 07:48 AM    HDL 36 (A) 08/03/2023 07:48 AM    TRIGLYCERIDE 137 08/03/2023 07:48 AM       Lab Results   Component Value Date/Time    TESTOSTERONE 269 (L) 08/03/2023 07:48 AM     No results found for: \"TSH\"  No results found for: \"FREET4\"  No results found for: \"URICACID\"  No components found for: \"VITB12\"  Lab Results   Component Value Date/Time    25HYDROXY 26 (L) 08/03/2023 07:48 AM    25HYDROXY 20 (L) 04/25/2022 08:26 AM     '          HPI    Review of Systems   Constitutional: Negative.    HENT: Negative.     Eyes: Negative.    Respiratory: Negative.     Cardiovascular: Negative.    Gastrointestinal: Negative.    Genitourinary: Negative.    Musculoskeletal: Negative.    Skin: Negative.    Neurological: Negative.    Endo/Heme/Allergies: Negative.    Psychiatric/Behavioral: Negative.                Objective     /62 (BP Location: Right arm, Patient Position: Sitting, BP Cuff Size: Adult)   Pulse 91   Temp 36.9 °C (98.5 °F) (Temporal)   Ht 1.778 m (5' 10\")   Wt 105 kg (232 lb)   SpO2 96%   BMI 33.29 kg/m²      Physical Exam  Constitutional:       General: He is not in acute distress.     Appearance: He is well-developed. He is not diaphoretic.   HENT:      Head: Normocephalic and atraumatic.      Right Ear: External ear normal.      Left Ear: External ear normal.      Nose: Nose normal.      Mouth/Throat:      Pharynx: No oropharyngeal exudate.   Eyes:      General: No scleral icterus.        Right eye: No discharge.         Left eye: No discharge.      Conjunctiva/sclera: Conjunctivae normal.      Pupils: Pupils are equal, round, and reactive to light.   Neck:      Thyroid: No thyromegaly.      Vascular: No " JVD.      Trachea: No tracheal deviation.   Cardiovascular:      Rate and Rhythm: Normal rate and regular rhythm.      Heart sounds: Normal heart sounds. No murmur heard.     No friction rub. No gallop.   Pulmonary:      Effort: Pulmonary effort is normal. No respiratory distress.      Breath sounds: Normal breath sounds. No stridor. No wheezing or rales.   Chest:      Chest wall: No tenderness.   Abdominal:      General: Bowel sounds are normal. There is no distension.      Palpations: Abdomen is soft. There is no mass.      Tenderness: There is no abdominal tenderness. There is no guarding or rebound.   Musculoskeletal:         General: No tenderness. Normal range of motion.      Cervical back: Normal range of motion and neck supple.   Lymphadenopathy:      Cervical: No cervical adenopathy.   Skin:     General: Skin is warm and dry.      Coloration: Skin is not pale.      Findings: No erythema or rash.   Neurological:      Mental Status: He is alert and oriented to person, place, and time.      Motor: No abnormal muscle tone.      Coordination: Coordination normal.      Deep Tendon Reflexes: Reflexes are normal and symmetric. Reflexes normal.   Psychiatric:         Behavior: Behavior normal.         Thought Content: Thought content normal.         Judgment: Judgment normal.                             Assessment & Plan        1. Class 1 obesity due to excess calories with serious comorbidity and body mass index (BMI) of 33.0 to 33.9 in adult      - Patient identified as having weight management issue.  Appropriate orders and counseling given.  - Referral to Nutrition Services    2. Mixed hyperlipidemia- mild no meds  Good control continue Mediterranean diet and exercise.  Recheck labs.  - PROSTATE SPECIFIC AG DIAGNOSTIC; Future  - TESTOSTERONE, FREE AND TOTAL; Future  - CBC WITH DIFFERENTIAL; Future  - Comp Metabolic Panel; Future  - TSH; Future  - Lipid Profile; Future    3. Elevated liver enzymes- borderline;  needs angela 3 mos; prob fatty liver  Mediterranean diet exercise and weight reduction.  Liver enzymes have returned to normal but on the high end of normal and still at risk since his liver ultrasound did show fatty liver  - Comp Metabolic Panel; Future    4. Hypogonadism male  Under good control continue with biweekly testosterone injections.  - PROSTATE SPECIFIC AG DIAGNOSTIC; Future  - TESTOSTERONE, FREE AND TOTAL; Future    5. IFG (impaired fasting glucose)  Good control continue monitoring diet and exercise.  - HEMOGLOBIN A1C; Future    6. Vitamin D deficiency       Good control continue vitamin D3 2000 units daily.  - VITAMIN D,25 HYDROXY (DEFICIENCY); Future

## 2023-10-04 ENCOUNTER — PATIENT MESSAGE (OUTPATIENT)
Dept: MEDICAL GROUP | Age: 38
End: 2023-10-04
Payer: COMMERCIAL

## 2023-10-04 DIAGNOSIS — E29.1 HYPOGONADISM MALE: ICD-10-CM

## 2023-10-04 RX ORDER — TESTOSTERONE CYPIONATE 200 MG/ML
200 INJECTION, SOLUTION INTRAMUSCULAR
Qty: 6 ML | Refills: 0 | Status: SHIPPED | OUTPATIENT
Start: 2023-10-04 | End: 2023-12-27

## 2024-03-06 ENCOUNTER — APPOINTMENT (OUTPATIENT)
Dept: MEDICAL GROUP | Age: 39
End: 2024-03-06

## 2024-03-14 ENCOUNTER — APPOINTMENT (OUTPATIENT)
Dept: MEDICAL GROUP | Age: 39
End: 2024-03-14

## 2024-08-01 ENCOUNTER — OFFICE VISIT (OUTPATIENT)
Dept: MEDICAL GROUP | Facility: PHYSICIAN GROUP | Age: 39
End: 2024-08-01
Payer: COMMERCIAL

## 2024-08-01 VITALS
RESPIRATION RATE: 20 BRPM | TEMPERATURE: 97.8 F | DIASTOLIC BLOOD PRESSURE: 80 MMHG | HEIGHT: 70 IN | BODY MASS INDEX: 34.23 KG/M2 | WEIGHT: 239.13 LBS | HEART RATE: 88 BPM | SYSTOLIC BLOOD PRESSURE: 118 MMHG | OXYGEN SATURATION: 98 %

## 2024-08-01 DIAGNOSIS — Z13.29 SCREENING FOR ENDOCRINE, METABOLIC AND IMMUNITY DISORDER: ICD-10-CM

## 2024-08-01 DIAGNOSIS — R74.8 ELEVATED LIVER ENZYMES: ICD-10-CM

## 2024-08-01 DIAGNOSIS — E55.9 VITAMIN D DEFICIENCY: ICD-10-CM

## 2024-08-01 DIAGNOSIS — E29.1 HYPOGONADISM MALE: ICD-10-CM

## 2024-08-01 DIAGNOSIS — E78.2 MIXED HYPERLIPIDEMIA: ICD-10-CM

## 2024-08-01 DIAGNOSIS — R06.83 SNORING: ICD-10-CM

## 2024-08-01 DIAGNOSIS — Z13.0 SCREENING FOR ENDOCRINE, METABOLIC AND IMMUNITY DISORDER: ICD-10-CM

## 2024-08-01 DIAGNOSIS — G47.19 EXCESSIVE DAYTIME SLEEPINESS: ICD-10-CM

## 2024-08-01 DIAGNOSIS — Z13.228 SCREENING FOR ENDOCRINE, METABOLIC AND IMMUNITY DISORDER: ICD-10-CM

## 2024-08-01 DIAGNOSIS — Z12.5 PROSTATE CANCER SCREENING: ICD-10-CM

## 2024-08-01 PROCEDURE — 99214 OFFICE O/P EST MOD 30 MIN: CPT

## 2024-08-01 PROCEDURE — 3079F DIAST BP 80-89 MM HG: CPT

## 2024-08-01 PROCEDURE — 3074F SYST BP LT 130 MM HG: CPT

## 2024-08-01 ASSESSMENT — PATIENT HEALTH QUESTIONNAIRE - PHQ9: CLINICAL INTERPRETATION OF PHQ2 SCORE: 0

## 2024-08-01 ASSESSMENT — FIBROSIS 4 INDEX: FIB4 SCORE: 0.4

## 2024-08-01 NOTE — PROGRESS NOTES
Subjective:     Chief Complaint   Patient presents with    Establish Care     Patricio Lundberg is a 38 y.o. male, who is here today to establish care and discuss hypergonadism and fatigue his prior PCP was Patricio Zarate MD.    Problem   Hypogonadism Male    History of low testosterone.  Patient was previously on testosterone replacement therapy with his previous PCP     Excessive Daytime Sleepiness    Treated for Low testosterone.  When he is on supplementation he feels extremely better and this is not as big of a problem.  Has never been screened for sleep apnea.  STOPBANG - Sleep Apnea Screening      Flowsheet Row Most Recent Value   S - Have you been told that you SNORE? Yes   T - Are you often TIRED during the day? Yes   O - Do you know if you stop breathing or has anyone witnessed you stop breathing while you were asleep? (OBSTRUCTION) No   P - Do you have high blood PRESSURE or on medication to control high blood pressure? No   B - Is your Body Mass Index greater than 35? (BMI) No   A - Are you 50 years old or older? (AGE) No   N - Are you a male with a NECK circumference greater than 17 inches, or a female with a neck circumference greater than 16 inches? No   G - Are you male? (GENDER) Yes   STOPBANG Total Score 3   MARVEL Risk Intermediate Risk             Attention Deficit Disorder (Add) Without Hyperactivity    Previously took medication when he was 6 years old and was only on them for a couple of months and has not been on them since.  Patient is coping well and managing without medication for many years now.     Mixed hyperlipidemia- mild no meds    Currently not taking any cholesterol-lowering medication and/or low-dose aspirin.  Lab Results   Component Value Date/Time    CHOLSTRLTOT 195 08/03/2023 0748    TRIGLYCERIDE 137 08/03/2023 0748    HDL 36 (A) 08/03/2023 0748     (H) 08/03/2023 0748   The 10-year ASCVD risk score (Matthew SHELDON, et al., 2019) is: 4.9%     Allergies: Toradol    No current  "outpatient medications on file.     Review of Systems   Constitutional:  Negative for chills, fever and weight loss.   Respiratory:  Negative for cough, shortness of breath and wheezing.    Cardiovascular:  Negative for chest pain, palpitations and leg swelling.   Gastrointestinal:  Negative for abdominal pain, blood in stool, constipation, diarrhea, nausea and vomiting.   Genitourinary:  Negative for hematuria.   Skin:  Negative for itching and rash.   Neurological:  Negative for dizziness, tingling and headaches.     Health Maintenance: Completed    Objective:     /80 (BP Location: Left arm, Patient Position: Sitting, BP Cuff Size: Adult)   Pulse 88   Temp 36.6 °C (97.8 °F) (Temporal)   Resp 20   Ht 1.778 m (5' 10\")   Wt 108 kg (239 lb 2 oz)   SpO2 98%  Body mass index is 34.31 kg/m².    Physical Exam  Vitals reviewed.   Constitutional:       General: He is not in acute distress.     Appearance: Normal appearance. He is not ill-appearing.   Cardiovascular:      Rate and Rhythm: Normal rate and regular rhythm.      Heart sounds: Normal heart sounds.   Pulmonary:      Effort: Pulmonary effort is normal.      Breath sounds: Normal breath sounds.   Abdominal:      General: Abdomen is flat.      Palpations: Abdomen is soft.   Musculoskeletal:         General: Normal range of motion.      Cervical back: Normal range of motion.   Skin:     General: Skin is warm and dry.   Neurological:      General: No focal deficit present.      Mental Status: He is alert.   Psychiatric:         Mood and Affect: Mood normal.         Behavior: Behavior normal.         Thought Content: Thought content normal.         Judgment: Judgment normal.        Assessment & Plan:     The following plan was discussed through shared decision making with the patient:    1. Mixed hyperlipidemia  Chronic, unknown.  Patient will get updated blood work to evaluate further cardiovascular risk  - Lipid Profile; Future    2. Hypogonadism " male  Chronic, uncontrolled.  Patient is stating a worsening of symptoms since being off of this testosterone replacement therapy.  Since patient is struggling while being off testosterone referral has been placed for further management; updated lab work  - PROSTATE SPECIFIC AG SCREENING; Future  - Referral to Urology  - TESTOSTERONE SERUM; Future    3. Snoring  4. Excessive daytime sleepiness  Chronic, uncontrolled.  Patient does struggle with low testosterone never been screened for sleep apnea.  Patient in office STOP-BANG score revealed an intermediate risk for MARVEL.  Referral has been placed for sleep medicine  - Referral to Pulmonary and Sleep Medicine    5. Elevated liver enzymes  History of elevated liver enzymes will get updated blood work to confirm that this is still a concern to further manage.  - Comp Metabolic Panel; Future    6. Vitamin D deficiency  History of vitamin D deficiency will get updated blood work to further monitor  - VITAMIN D,25 HYDROXY (DEFICIENCY); Future    7. Screening for endocrine, metabolic and immunity disorder  Patient establishing care today in office; due for updated lab work/screenings.  Will follow-up in [facility] with lab results.    Discussed with patient that if there is a significant amount of lab abnormalities we will have a follow-up appointment and I will indicate this is in my follow-up message with lab results.  Patient stated verbal understanding.  - TSH WITH REFLEX TO FT4; Future  - CBC WITH DIFFERENTIAL; Future  - HEMOGLOBIN A1C; Future  - ESTIMATED GFR; Future    8. Prostate cancer screening  See #7  - PROSTATE SPECIFIC AG SCREENING; Future      Return in about 6 months (around 2/1/2025) for Annual, Labs.       Please note that this dictation was created using voice recognition software. I have made every reasonable attempt to correct obvious errors, but I expect that there are errors of grammar and possibly content that I did not discover before finalizing the  note.    WILLIAM Ibarra  [facility]  [facility]

## 2024-08-06 ASSESSMENT — ENCOUNTER SYMPTOMS
HEADACHES: 0
CHILLS: 0
VOMITING: 0
TINGLING: 0
PALPITATIONS: 0
CONSTIPATION: 0
DIARRHEA: 0
WHEEZING: 0
BLOOD IN STOOL: 0
COUGH: 0
ABDOMINAL PAIN: 0
WEIGHT LOSS: 0
DIZZINESS: 0
SHORTNESS OF BREATH: 0
FEVER: 0
NAUSEA: 0

## 2024-08-07 ENCOUNTER — HOSPITAL ENCOUNTER (OUTPATIENT)
Dept: LAB | Facility: MEDICAL CENTER | Age: 39
End: 2024-08-07
Payer: COMMERCIAL

## 2024-08-07 DIAGNOSIS — Z12.5 PROSTATE CANCER SCREENING: ICD-10-CM

## 2024-08-07 DIAGNOSIS — Z13.29 SCREENING FOR ENDOCRINE, METABOLIC AND IMMUNITY DISORDER: ICD-10-CM

## 2024-08-07 DIAGNOSIS — Z13.228 SCREENING FOR ENDOCRINE, METABOLIC AND IMMUNITY DISORDER: ICD-10-CM

## 2024-08-07 DIAGNOSIS — E55.9 VITAMIN D DEFICIENCY: ICD-10-CM

## 2024-08-07 DIAGNOSIS — E78.2 MIXED HYPERLIPIDEMIA: ICD-10-CM

## 2024-08-07 DIAGNOSIS — R74.8 ELEVATED LIVER ENZYMES: ICD-10-CM

## 2024-08-07 DIAGNOSIS — E29.1 HYPOGONADISM MALE: ICD-10-CM

## 2024-08-07 DIAGNOSIS — Z13.0 SCREENING FOR ENDOCRINE, METABOLIC AND IMMUNITY DISORDER: ICD-10-CM

## 2024-08-07 LAB
25(OH)D3 SERPL-MCNC: 27 NG/ML (ref 30–100)
ALBUMIN SERPL BCP-MCNC: 4.4 G/DL (ref 3.2–4.9)
ALBUMIN/GLOB SERPL: 1.5 G/DL
ALP SERPL-CCNC: 102 U/L (ref 30–99)
ALT SERPL-CCNC: 55 U/L (ref 2–50)
ANION GAP SERPL CALC-SCNC: 11 MMOL/L (ref 7–16)
AST SERPL-CCNC: 34 U/L (ref 12–45)
BASOPHILS # BLD AUTO: 0.5 % (ref 0–1.8)
BASOPHILS # BLD: 0.05 K/UL (ref 0–0.12)
BILIRUB SERPL-MCNC: 0.4 MG/DL (ref 0.1–1.5)
BUN SERPL-MCNC: 12 MG/DL (ref 8–22)
CALCIUM ALBUM COR SERPL-MCNC: 8.7 MG/DL (ref 8.5–10.5)
CALCIUM SERPL-MCNC: 9 MG/DL (ref 8.5–10.5)
CHLORIDE SERPL-SCNC: 104 MMOL/L (ref 96–112)
CHOLEST SERPL-MCNC: 177 MG/DL (ref 100–199)
CO2 SERPL-SCNC: 23 MMOL/L (ref 20–33)
CREAT SERPL-MCNC: 0.91 MG/DL (ref 0.5–1.4)
EOSINOPHIL # BLD AUTO: 0.29 K/UL (ref 0–0.51)
EOSINOPHIL NFR BLD: 2.7 % (ref 0–6.9)
ERYTHROCYTE [DISTWIDTH] IN BLOOD BY AUTOMATED COUNT: 40.8 FL (ref 35.9–50)
EST. AVERAGE GLUCOSE BLD GHB EST-MCNC: 120 MG/DL
FASTING STATUS PATIENT QL REPORTED: NORMAL
GFR SERPLBLD CREATININE-BSD FMLA CKD-EPI: 109 ML/MIN/1.73 M 2
GLOBULIN SER CALC-MCNC: 3 G/DL (ref 1.9–3.5)
GLUCOSE SERPL-MCNC: 87 MG/DL (ref 65–99)
HBA1C MFR BLD: 5.8 % (ref 4–5.6)
HCT VFR BLD AUTO: 52.8 % (ref 42–52)
HDLC SERPL-MCNC: 34 MG/DL
HGB BLD-MCNC: 17.4 G/DL (ref 14–18)
IMM GRANULOCYTES # BLD AUTO: 0.04 K/UL (ref 0–0.11)
IMM GRANULOCYTES NFR BLD AUTO: 0.4 % (ref 0–0.9)
LDLC SERPL CALC-MCNC: 116 MG/DL
LYMPHOCYTES # BLD AUTO: 2.55 K/UL (ref 1–4.8)
LYMPHOCYTES NFR BLD: 23.6 % (ref 22–41)
MCH RBC QN AUTO: 29.8 PG (ref 27–33)
MCHC RBC AUTO-ENTMCNC: 33 G/DL (ref 32.3–36.5)
MCV RBC AUTO: 90.4 FL (ref 81.4–97.8)
MONOCYTES # BLD AUTO: 1 K/UL (ref 0–0.85)
MONOCYTES NFR BLD AUTO: 9.3 % (ref 0–13.4)
NEUTROPHILS # BLD AUTO: 6.86 K/UL (ref 1.82–7.42)
NEUTROPHILS NFR BLD: 63.5 % (ref 44–72)
NRBC # BLD AUTO: 0 K/UL
NRBC BLD-RTO: 0 /100 WBC (ref 0–0.2)
PLATELET # BLD AUTO: 259 K/UL (ref 164–446)
PMV BLD AUTO: 10.4 FL (ref 9–12.9)
POTASSIUM SERPL-SCNC: 4.5 MMOL/L (ref 3.6–5.5)
PROT SERPL-MCNC: 7.4 G/DL (ref 6–8.2)
PSA SERPL-MCNC: 0.71 NG/ML (ref 0–4)
RBC # BLD AUTO: 5.84 M/UL (ref 4.7–6.1)
SODIUM SERPL-SCNC: 138 MMOL/L (ref 135–145)
TESTOST SERPL-MCNC: 264 NG/DL (ref 175–781)
TRIGL SERPL-MCNC: 134 MG/DL (ref 0–149)
TSH SERPL DL<=0.005 MIU/L-ACNC: 1.63 UIU/ML (ref 0.38–5.33)
WBC # BLD AUTO: 10.8 K/UL (ref 4.8–10.8)

## 2024-08-07 PROCEDURE — 36415 COLL VENOUS BLD VENIPUNCTURE: CPT

## 2024-08-07 PROCEDURE — 85025 COMPLETE CBC W/AUTO DIFF WBC: CPT

## 2024-08-07 PROCEDURE — 80053 COMPREHEN METABOLIC PANEL: CPT

## 2024-08-07 PROCEDURE — 80061 LIPID PANEL: CPT

## 2024-08-07 PROCEDURE — 84403 ASSAY OF TOTAL TESTOSTERONE: CPT

## 2024-08-07 PROCEDURE — 83036 HEMOGLOBIN GLYCOSYLATED A1C: CPT

## 2024-08-07 PROCEDURE — 84443 ASSAY THYROID STIM HORMONE: CPT

## 2024-08-07 PROCEDURE — 84153 ASSAY OF PSA TOTAL: CPT

## 2024-08-07 PROCEDURE — 82306 VITAMIN D 25 HYDROXY: CPT

## 2024-08-08 ENCOUNTER — TELEPHONE (OUTPATIENT)
Dept: MEDICAL GROUP | Facility: PHYSICIAN GROUP | Age: 39
End: 2024-08-08

## 2024-08-08 ENCOUNTER — OFFICE VISIT (OUTPATIENT)
Dept: MEDICAL GROUP | Facility: PHYSICIAN GROUP | Age: 39
End: 2024-08-08
Payer: COMMERCIAL

## 2024-08-08 VITALS
BODY MASS INDEX: 34.07 KG/M2 | HEIGHT: 70 IN | WEIGHT: 238 LBS | HEART RATE: 90 BPM | RESPIRATION RATE: 16 BRPM | DIASTOLIC BLOOD PRESSURE: 72 MMHG | OXYGEN SATURATION: 97 % | SYSTOLIC BLOOD PRESSURE: 128 MMHG | TEMPERATURE: 97.9 F

## 2024-08-08 DIAGNOSIS — K52.9 GASTROENTERITIS: ICD-10-CM

## 2024-08-08 PROCEDURE — 99213 OFFICE O/P EST LOW 20 MIN: CPT | Performed by: FAMILY MEDICINE

## 2024-08-08 PROCEDURE — 3074F SYST BP LT 130 MM HG: CPT | Performed by: FAMILY MEDICINE

## 2024-08-08 PROCEDURE — 3078F DIAST BP <80 MM HG: CPT | Performed by: FAMILY MEDICINE

## 2024-08-08 ASSESSMENT — FIBROSIS 4 INDEX: FIB4 SCORE: 0.71

## 2024-08-08 NOTE — ASSESSMENT & PLAN NOTE
This is a new problem.  Patient states about 2 days ago he started to have some sulfur burps along with abdominal bloating and just vague abdominal discomfort.  His appetite also started to get decreased.  Last night he started having watery diarrhea that went through most of the night and then subsided this morning.  But he states he really has not eaten much either.  He is trying to hydrate.  No blood in the stool.  No recent travel or antibiotics.  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

## 2024-08-08 NOTE — TELEPHONE ENCOUNTER
Patient called stating he is on our schedule today for food poisoning or a stomach bug. he was requesting a order for a Stool sample so he can  the stool sample kit from the lab

## 2024-08-08 NOTE — LETTER
[address] [id number]     August 8, 2024    Patient: Patricio Lundberg   YOB: 1985   Date of Visit: 8/8/2024       To Whom It May Concern:    Patricio Lundberg was seen and treated in our department on 8/8/2024. Please excuse from work today and tomorrow.    Sincerely,     Shadi Wharton III, M.D.

## 2024-08-08 NOTE — PROGRESS NOTES
Subjective:     CC: Here complaining of abdominal discomfort and diarrhea.    HPI:   Patricio presents today with the following medical concerns:    Gastroenteritis  This is a new problem.  Patient states about 2 days ago he started to have some sulfur burps along with abdominal bloating and just vague abdominal discomfort.  His appetite also started to get decreased.  Last night he started having watery diarrhea that went through most of the night and then subsided this morning.  But he states he really has not eaten much either.  He is trying to hydrate.  No blood in the stool.  No recent travel or antibiotics.  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    Past Medical History:   Diagnosis Date    Acute appendicitis with localized peritonitis 11/1/2022    Acute postoperative pain 11/1/2022    ASTHMA     Concussion with unknown loss of consciousness status 11/30/2022    Contusion of head, subsequent encounter 11/30/2022    Low energy 11/18/2019    Memory loss of unknown cause 11/30/2022       Social History     Tobacco Use    Smoking status: Every Day     Current packs/day: 0.50     Types: Cigarettes    Smokeless tobacco: Current    Tobacco comments:     ZYN POUCHES and half a pack a day    Vaping Use    Vaping status: Never Used   Substance Use Topics    Alcohol use: Yes     Comment: rarely     Drug use: No       No current [facility]-ordered outpatient medications on file.     No current [facility]-ordered facility-administered medications on file.       Allergies:  Toradol    Health Maintenance: Completed    ROS:  Gen: no fevers/chills, no changes in weight  Eyes: no changes in vision  ENT: no sore throat, no hearing loss, no bloody nose  Pulm: no sob, no cough  CV: no chest pain, no palpitations  GI: no vomiting  : no dysuria  MSk: no myalgias  Skin: no rash  Neuro: no headaches, no numbness/tingling  Heme/Lymph: no easy bruising      Objective:       Exam:  /72 (BP Location:  "Right arm, Patient Position: Sitting, BP Cuff Size: Adult)   Pulse 90   Temp 36.6 °C (97.9 °F) (Temporal)   Resp 16   Ht 1.778 m (5' 10\")   Wt 108 kg (238 lb)   SpO2 97%   BMI 34.15 kg/m²  Body mass index is 34.15 kg/m².    Gen: Alert and oriented, appears mildly ill  Lungs: Normal effort,  Abdomen: Soft, nontender, no organomegaly or masses.  Normal bowel sounds.  Ext: No clubbing, cyanosis, edema.        Assessment & Plan:     38 y.o. male with the following -     1. Gastroenteritis  This is an acute problem.  I told patient this most likely is a viral gastroenteritis.  He did take some Pepto and thought that helped a little external he can do that again.  If the diarrhea recurs he can use Imodium A-D.  Also should be on a brat diet.  Work release for today and tomorrow given.  If his symptoms recur into next week he is to let us know and we will do a workup at that time.      Return if symptoms worsen or fail to improve.    Please note that this dictation was created using voice recognition software. I have made every reasonable attempt to correct obvious errors, but I expect that there are errors of grammar and possibly content that I did not discover before finalizing the note.        "

## 2024-08-23 ENCOUNTER — OFFICE VISIT (OUTPATIENT)
Dept: MEDICAL GROUP | Facility: PHYSICIAN GROUP | Age: 39
End: 2024-08-23
Payer: COMMERCIAL

## 2024-08-23 VITALS
TEMPERATURE: 98.7 F | WEIGHT: 243 LBS | HEART RATE: 80 BPM | DIASTOLIC BLOOD PRESSURE: 62 MMHG | SYSTOLIC BLOOD PRESSURE: 114 MMHG | OXYGEN SATURATION: 96 % | HEIGHT: 70 IN | BODY MASS INDEX: 34.79 KG/M2 | RESPIRATION RATE: 12 BRPM

## 2024-08-23 DIAGNOSIS — R10.84 GENERALIZED ABDOMINAL PAIN: ICD-10-CM

## 2024-08-23 DIAGNOSIS — E78.2 MIXED HYPERLIPIDEMIA: ICD-10-CM

## 2024-08-23 DIAGNOSIS — E66.9 OBESITY (BMI 30-39.9): ICD-10-CM

## 2024-08-23 DIAGNOSIS — K76.0 HEPATIC STEATOSIS: ICD-10-CM

## 2024-08-23 DIAGNOSIS — R74.8 ELEVATED LIVER ENZYMES: ICD-10-CM

## 2024-08-23 DIAGNOSIS — R73.03 PREDIABETES: ICD-10-CM

## 2024-08-23 PROCEDURE — 3078F DIAST BP <80 MM HG: CPT

## 2024-08-23 PROCEDURE — 99214 OFFICE O/P EST MOD 30 MIN: CPT

## 2024-08-23 PROCEDURE — 3074F SYST BP LT 130 MM HG: CPT

## 2024-08-23 ASSESSMENT — FIBROSIS 4 INDEX: FIB4 SCORE: 0.71

## 2024-08-23 NOTE — PROGRESS NOTES
"Subjective:     Chief Complaint   Patient presents with    Follow-Up     For ozempic and labs       HPI: Patricio presents today with  Problem   Prediabetes   Hepatic Steatosis    ABD US 8/2023  Echogenic heterogeneous liver, most commonly hepatic steatosis.     Labs:       Mixed hyperlipidemia- mild no meds    Currently not taking any cholesterol-lowering medication and/or low-dose aspirin.  Lab Results   Component Value Date/Time    CHOLSTRLTOT 177 08/07/2024 1041    TRIGLYCERIDE 134 08/07/2024 1041    HDL 34 (A) 08/07/2024 1041     (H) 08/07/2024 1041   The 10-year ASCVD risk score (Matthew SHELDON, et al., 2019) is: 4.1%     Elevated Liver Enzymes   Obesity (Bmi 30-39.9)    Current weight: 243 lb  BMI: 34.87 kg/m2    Struggling with weight loss  Recent blood work showed prediabetes  Patient also struggles with hyperlipidemia and fatty liver     Allergies: Toradol  ROS per HPI  Health Maintenance: Deferred at this time   Objective:     /62 (BP Location: Left arm, Patient Position: Sitting, BP Cuff Size: Adult)   Pulse 80   Temp 37.1 °C (98.7 °F) (Temporal)   Resp 12   Ht 1.778 m (5' 10\")   Wt 110 kg (243 lb)   SpO2 96%   BMI 34.87 kg/m²  Body mass index is 34.87 kg/m².     Physical Exam  Vitals reviewed.   Constitutional:       General: He is not in acute distress.     Appearance: Normal appearance. He is not ill-appearing.   Cardiovascular:      Rate and Rhythm: Normal rate and regular rhythm.   Pulmonary:      Effort: Pulmonary effort is normal. No respiratory distress.   Abdominal:      General: Abdomen is flat.      Palpations: Abdomen is soft.   Skin:     Coloration: Skin is not jaundiced or pale.   Neurological:      General: No focal deficit present.      Mental Status: He is alert and oriented to person, place, and time.   Psychiatric:         Mood and Affect: Mood normal.         Behavior: Behavior normal.         Thought Content: Thought content normal.         Judgment: Judgment normal. "        Results for orders placed or performed during the hospital encounter of 08/07/24   TESTOSTERONE SERUM   Result Value Ref Range    Testosterone,Total 264 175 - 781 ng/dL   ESTIMATED GFR   Result Value Ref Range    GFR (CKD-EPI) 109 >60 mL/min/1.73 m 2   HEMOGLOBIN A1C   Result Value Ref Range    Glycohemoglobin 5.8 (H) 4.0 - 5.6 %    Est Avg Glucose 120 mg/dL   Comp Metabolic Panel   Result Value Ref Range    Sodium 138 135 - 145 mmol/L    Potassium 4.5 3.6 - 5.5 mmol/L    Chloride 104 96 - 112 mmol/L    Co2 23 20 - 33 mmol/L    Anion Gap 11.0 7.0 - 16.0    Glucose 87 65 - 99 mg/dL    Bun 12 8 - 22 mg/dL    Creatinine 0.91 0.50 - 1.40 mg/dL    Calcium 9.0 8.5 - 10.5 mg/dL    Correct Calcium 8.7 8.5 - 10.5 mg/dL    AST(SGOT) 34 12 - 45 U/L    ALT(SGPT) 55 (H) 2 - 50 U/L    Alkaline Phosphatase 102 (H) 30 - 99 U/L    Total Bilirubin 0.4 0.1 - 1.5 mg/dL    Albumin 4.4 3.2 - 4.9 g/dL    Total Protein 7.4 6.0 - 8.2 g/dL    Globulin 3.0 1.9 - 3.5 g/dL    A-G Ratio 1.5 g/dL   CBC WITH DIFFERENTIAL   Result Value Ref Range    WBC 10.8 4.8 - 10.8 K/uL    RBC 5.84 4.70 - 6.10 M/uL    Hemoglobin 17.4 14.0 - 18.0 g/dL    Hematocrit 52.8 (H) 42.0 - 52.0 %    MCV 90.4 81.4 - 97.8 fL    MCH 29.8 27.0 - 33.0 pg    MCHC 33.0 32.3 - 36.5 g/dL    RDW 40.8 35.9 - 50.0 fL    Platelet Count 259 164 - 446 K/uL    MPV 10.4 9.0 - 12.9 fL    Neutrophils-Polys 63.50 44.00 - 72.00 %    Lymphocytes 23.60 22.00 - 41.00 %    Monocytes 9.30 0.00 - 13.40 %    Eosinophils 2.70 0.00 - 6.90 %    Basophils 0.50 0.00 - 1.80 %    Immature Granulocytes 0.40 0.00 - 0.90 %    Nucleated RBC 0.00 0.00 - 0.20 /100 WBC    Neutrophils (Absolute) 6.86 1.82 - 7.42 K/uL    Lymphs (Absolute) 2.55 1.00 - 4.80 K/uL    Monos (Absolute) 1.00 (H) 0.00 - 0.85 K/uL    Eos (Absolute) 0.29 0.00 - 0.51 K/uL    Baso (Absolute) 0.05 0.00 - 0.12 K/uL    Immature Granulocytes (abs) 0.04 0.00 - 0.11 K/uL    NRBC (Absolute) 0.00 K/uL   Lipid Profile   Result Value Ref Range     Cholesterol,Tot 177 100 - 199 mg/dL    Triglycerides 134 0 - 149 mg/dL    HDL 34 (A) >=40 mg/dL     (H) <100 mg/dL   TSH WITH REFLEX TO FT4   Result Value Ref Range    TSH 1.630 0.380 - 5.330 uIU/mL   VITAMIN D,25 HYDROXY (DEFICIENCY)   Result Value Ref Range    25-Hydroxy   Vitamin D 25 27 (L) 30 - 100 ng/mL   PROSTATE SPECIFIC AG SCREENING   Result Value Ref Range    Prostatic Specific Antigen Tot 0.71 0.00 - 4.00 ng/mL   FASTING STATUS   Result Value Ref Range    Fasting Status Fasting         Assessment and Plan:     The following treatment plan was discussed through shared decision making with the patient:    1. Prediabetes  2. Mixed hyperlipidemia- mild no meds  3. Elevated liver enzymes  4. Hepatic steatosis  5. Obesity (BMI 30-39.9)  6. Generalized abdominal pain    Lab results reviewed in office today.  Patient would like to proceed with GLP-1 agonist for weight loss/prediabetes/hyperlipidemia as well as help overall with fatty liver.  Discussed risk and benefits of injectable GLP-1 agonist.  Patient will follow-up in office regarding medication further refills if he is able to get this medication through his insurance for weight loss  Patient still struggling with generalized abdominal pain will follow-up with repeat ultrasound of abdomen.    - US-ABDOMEN COMPLETE SURVEY; Future  - Semaglutide,0.25 or 0.5MG/DOS, 2 MG/3ML Solution Pen-injector; Inject 0.25 mg under the skin every 7 days. For the first 3 weeks then increase to 0.5 mg every 7 days  Dispense: 9 mL; Refill: 0      Return in about 3 months (around 11/23/2024) for Med Check, Labs, Weight Loss Management.         Please note that this note was created using dictation with voice recognition software. I have made every reasonable attempt to correct obvious errors, but I expect that there are errors of grammar and possibly content that I did not discover before finalizing the note.    WILLIAM Ibarra  [facility]  Medical Group

## 2024-08-26 ENCOUNTER — TELEPHONE (OUTPATIENT)
Dept: MEDICAL GROUP | Facility: PHYSICIAN GROUP | Age: 39
End: 2024-08-26
Payer: COMMERCIAL

## 2024-08-26 NOTE — TELEPHONE ENCOUNTER
MEDICATION PRIOR AUTHORIZATION NEEDED:    1. Name of Medication: Ozempic    2. Requested By (Name of Pharmacy): [facility]     3. Is insurance on file current? Yes    4. What is the name & phone number of the 3rd party payor? Mandy

## 2024-09-10 ENCOUNTER — TELEPHONE (OUTPATIENT)
Dept: HEALTH INFORMATION MANAGEMENT | Facility: OTHER | Age: 39
End: 2024-09-10
Payer: COMMERCIAL

## 2024-09-13 ENCOUNTER — OFFICE VISIT (OUTPATIENT)
Dept: UROLOGY | Facility: MEDICAL CENTER | Age: 39
End: 2024-09-13
Payer: COMMERCIAL

## 2024-09-13 DIAGNOSIS — E29.1 HYPOGONADISM IN MALE: ICD-10-CM

## 2024-09-13 PROCEDURE — 99204 OFFICE O/P NEW MOD 45 MIN: CPT | Performed by: UROLOGY

## 2024-09-13 RX ORDER — TESTOSTERONE CYPIONATE 200 MG/ML
200 INJECTION, SOLUTION INTRAMUSCULAR
Qty: 1 ML | Refills: 5 | Status: SHIPPED | OUTPATIENT
Start: 2024-09-13 | End: 2024-09-18 | Stop reason: SDUPTHER

## 2024-09-13 NOTE — PROGRESS NOTES
Chief Complaint: low testosterone    The patient was referred by RICKY Ibarra for evaluation of the above chief complaint    History of Present Illness:    Patricio Lundberg is a 38 y.o. male who presents today for low teststerone  - Previously on T supplementation for ~1 year  - When off of T, significant hypogonadal symptoms  - Fatigue, weakness, low libido, inability to focus  - Did very well on T supplementation  - Now has been off due to traveling / change in jobs  - T consistently < 300   - Has 2 kids and does not desire fertility    Subjective    Family History   Problem Relation Age of Onset    Lung Disease Mother         cancer    Stroke Mother     Cancer Father         brain     Colorectal Cancer Neg Hx     Breast Cancer Neg Hx     Dementia Neg Hx     Diabetes Neg Hx     Heart Disease Neg Hx     Hypertension Neg Hx     Hyperlipidemia Neg Hx      Social History     Socioeconomic History    Marital status:      Spouse name: Not on file    Number of children: Not on file    Years of education: Not on file    Highest education level: Not on file   Occupational History    Not on file   Tobacco Use    Smoking status: Every Day     Current packs/day: 0.50     Types: Cigarettes    Smokeless tobacco: Current    Tobacco comments:     ZYN POUCHES and half a pack a day    Vaping Use    Vaping status: Never Used   Substance and Sexual Activity    Alcohol use: Yes     Comment: rarely     Drug use: No    Sexual activity: Yes   Other Topics Concern    Not on file   Social History Narrative    Not on file     Social Determinants of Health     Financial Resource Strain: Not on file   Food Insecurity: Not on file   Transportation Needs: Not on file   Physical Activity: Not on file   Stress: Not on file   Social Connections: Not on file   Intimate Partner Violence: Not on file   Housing Stability: Not on file     Past Surgical History:   Procedure Laterality Date    [place] LAP,APPENDECTOMY N/A 11/1/2022     "Procedure: APPENDECTOMY, LAPAROSCOPIC;  Surgeon: Ritchie Barnett M.D.;  Location: SURGERY [facility];  Service: General     Past Medical History:   Diagnosis Date    Acute appendicitis with localized peritonitis 11/1/2022    Acute postoperative pain 11/1/2022    ASTHMA     Concussion with unknown loss of consciousness status 11/30/2022    Contusion of head, subsequent encounter 11/30/2022    Low energy 11/18/2019    Memory loss of unknown cause 11/30/2022     Current Outpatient Medications   Medication Sig Dispense Refill    testosterone cypionate (DEPO-TESTOSTERONE) 200 MG/ML injection Inject 1 mL into the shoulder, thigh, or buttocks every 14 days for 14 days. 1 mL 5    NEEDLE, DISP, 22 G 22G X 1\" Misc 1 Units every 14 days. 100 Each 3    Semaglutide,0.25 or 0.5MG/DOS, 2 MG/3ML Solution Pen-injector Inject 0.25 mg under the skin every 7 days. For the first 3 weeks then increase to 0.5 mg every 7 days 9 mL 0     No current facility-administered medications for this visit.     Allergies   Allergen Reactions    Toradol Swelling     Per patient he felt like his throat was swelling shut.  After 10 minutes the sensation resolved       Review of systems was conducted and was negative except for as explicitly listed in the History of Present Illness.       Objective   There were no vitals taken for this visit.  Physical Exam  Vitals reviewed.   HENT:      Head: Normocephalic.      Nose: Nose normal.      Mouth/Throat:      Pharynx: Oropharynx is clear.   Eyes:      Conjunctiva/sclera: Conjunctivae normal.   Cardiovascular:      Rate and Rhythm: Normal rate.      Pulses: Normal pulses.   Pulmonary:      Effort: Pulmonary effort is normal.   Abdominal:      Palpations: Abdomen is soft.   Musculoskeletal:         General: Normal range of motion.      Cervical back: Neck supple.   Skin:     General: Skin is warm.   Neurological:      Mental Status: He is alert. Mental status is at baseline.   Psychiatric:         Mood and " Affect: Mood normal.         Lab/Radiology/Diagnostic Review:  CBC   Lab Results   Component Value Date/Time    WBC 10.8 08/07/2024 1041    RBC 5.84 08/07/2024 1041    HEMOGLOBIN 17.4 08/07/2024 1041    HEMATOCRIT 52.8 (H) 08/07/2024 1041    MCV 90.4 08/07/2024 1041    MCH 29.8 08/07/2024 1041    MCHC 33.0 08/07/2024 1041    RDW 40.8 08/07/2024 1041    MPV 10.4 08/07/2024 1041    LYMPHOCYTES 23.60 08/07/2024 1041    LYMPHS 2.55 08/07/2024 1041    MONOCYTES 9.30 08/07/2024 1041    MONOS 1.00 (H) 08/07/2024 1041    EOSINOPHILS 2.70 08/07/2024 1041    EOS 0.29 08/07/2024 1041    BASOPHILS 0.50 08/07/2024 1041    BASO 0.05 08/07/2024 1041    NRBC 0.00 08/07/2024 1041       CMP   Lab Results   Component Value Date/Time    SODIUM 138 08/07/2024 1041    POTASSIUM 4.5 08/07/2024 1041    CHLORIDE 104 08/07/2024 1041    CO2 23 08/07/2024 1041    ANION 11.0 08/07/2024 1041    GLUCOSE 87 08/07/2024 1041    BUN 12 08/07/2024 1041    CREATININE 0.91 08/07/2024 1041    GFRCKD 109 08/07/2024 1041    CALCIUM 9.0 08/07/2024 1041    CORRCALC 8.7 08/07/2024 1041    ASTSGOT 34 08/07/2024 1041    ALTSGPT 55 (H) 08/07/2024 1041    ALKPHOSPHAT 102 (H) 08/07/2024 1041    TBILIRUBIN 0.4 08/07/2024 1041    ALBUMIN 4.4 08/07/2024 1041    TOTPROTEIN 7.4 08/07/2024 1041    GLOBULIN 3.0 08/07/2024 1041    AGRATIO 1.5 08/07/2024 1041     INFERTILITY   Lab Results   Component Value Date/Time    TESTOSTERONE 264 08/07/2024 1041    FREETESTOST 58 08/03/2023 0748    SEXHORM 22 08/03/2023 0748     PSA   Lab Results   Component Value Date/Time    PSATOTAL 0.71 08/07/2024 1041       I have reviewed and independently examined the lab results.  My findings are given in the HPI or above with the associated tests.        Assessment & Plan    It was a pleasure speaking with Patricio Lundberg today.    Patricio Lundberg is a 38 y.o. male w/ low testosterone  - Will restart supplementation  - Prior dose was 200 mg every 2 weeks, reports this worked well  for him in the past  - Discussed risks, benefits, alternatives, and side effects to this medication  - Will plan for blood work in 3 months   - Blood work ordered and instructions provided

## 2024-09-17 ENCOUNTER — TELEPHONE (OUTPATIENT)
Dept: UROLOGY | Facility: MEDICAL CENTER | Age: 39
End: 2024-09-17
Payer: COMMERCIAL

## 2024-09-17 DIAGNOSIS — E29.1 HYPOGONADISM IN MALE: ICD-10-CM

## 2024-09-17 NOTE — TELEPHONE ENCOUNTER
Dr Lomax,    Pt called to state that his insurance company will allow a 3 month supply of testosterone so can you kindly update his Rx and send to pharmacy on file.      [id number] - [place], [place] - [address] At [facility]. & [address]

## 2024-09-18 ENCOUNTER — TELEPHONE (OUTPATIENT)
Dept: UROLOGY | Facility: MEDICAL CENTER | Age: 39
End: 2024-09-18
Payer: COMMERCIAL

## 2024-09-18 DIAGNOSIS — E29.1 HYPOGONADISM IN MALE: ICD-10-CM

## 2024-09-18 RX ORDER — TESTOSTERONE CYPIONATE 200 MG/ML
200 INJECTION, SOLUTION INTRAMUSCULAR
Qty: 6 ML | Refills: 0 | Status: SHIPPED | OUTPATIENT
Start: 2024-09-18 | End: 2024-12-11

## 2024-09-18 NOTE — TELEPHONE ENCOUNTER
Good morning, Dr Lomax,      I didn't mean to confuse you. Pt called and stated that he did not want to drive to pharmacy for Rx everry two weeks, he wanted an updated Rx so he could  every 90 days

## 2025-02-28 ENCOUNTER — OFFICE VISIT (OUTPATIENT)
Dept: SLEEP MEDICINE | Facility: MEDICAL CENTER | Age: 40
End: 2025-02-28
Payer: COMMERCIAL

## 2025-02-28 VITALS
WEIGHT: 240.8 LBS | RESPIRATION RATE: 16 BRPM | SYSTOLIC BLOOD PRESSURE: 116 MMHG | BODY MASS INDEX: 34.47 KG/M2 | HEIGHT: 70 IN | OXYGEN SATURATION: 94 % | DIASTOLIC BLOOD PRESSURE: 74 MMHG | HEART RATE: 91 BPM

## 2025-02-28 DIAGNOSIS — E29.1 HYPOGONADISM MALE: ICD-10-CM

## 2025-02-28 DIAGNOSIS — R06.83 SNORING: ICD-10-CM

## 2025-02-28 DIAGNOSIS — G47.19 EXCESSIVE DAYTIME SLEEPINESS: ICD-10-CM

## 2025-02-28 DIAGNOSIS — G47.30 SLEEP DISORDER BREATHING: ICD-10-CM

## 2025-02-28 PROCEDURE — 99211 OFF/OP EST MAY X REQ PHY/QHP: CPT

## 2025-02-28 ASSESSMENT — PATIENT HEALTH QUESTIONNAIRE - PHQ9: CLINICAL INTERPRETATION OF PHQ2 SCORE: 0

## 2025-02-28 ASSESSMENT — FIBROSIS 4 INDEX: FIB4 SCORE: 0.73

## 2025-02-28 NOTE — PROGRESS NOTES
[facility] Consult Note     Date: 2/28/2025 / Time: 10:51 AM      Thank you for requesting a sleep medicine consultation on Patricio Lundberg at the sleep center. Presents today with the chief complaints of snoring, fatigue, brain fog, occasional excessive daytime sleepiness. He is referred by RICKY Ibarra  [address] [place] [address] for evaluation and treatment of sleep disorder breathing .     HISTORY OF PRESENT ILLNESS:     Patricio Lundberg is a 39 y.o. male with obesity, prediabetes, ADD, and snoring.  Presents to Sleep Clinic for evaluation of sleep.    States for years he has been told that he snores.  He also reports over the last couple weeks he has become more fatigued and tired during the day.  He states his tiredness and sleepiness during the day has been off and on over the last several years.    He has been told by his wife that he snores in his sleep.  In addition to the snoring he will sometimes wake up with headaches and he does not find his sleep restorative.    During the day he is impacted by his brain fog.  His trouble with concentration and memory has impacted his work performance.  He finds that he can be tired and sleepy during the day which can impact his performance as well.  He naps daily for about an hour.    He is using the intake nasal dilator system which has improved his nasal breathing.  In doing so his dry mouth has improved.    As per supplemental questionnaire to be scanned or imported into chart:    Muse Sleepiness Score: 8    Sleep Schedule  Bedtime: Weekday 12am Weekend 12am  Wake time: Weekday 830am Weekend 9-10 am   Sleep-onset latency: 3-4 days >30min   Awakenings from sleep: 1  Difficulty falling back asleep: sometimes   Bedroom partner: yes   Naps: daily, 1 hour     DAYTIME SYMPTOMS:   Excessive daytime sleepiness: Yes  Daytime fatigue: Yes  Difficulty concentrating: Yes  Memory problems:  "Yes  Irritability:Yes  Work/school performance issues: Yes  Sleepiness with driving: No   Caffeine/stimulant use: Yes  Alcohol use:No     SLEEP RELATED SYMPTOMS  Snoring: Yes  Witnessed apnea or gasping/choking: No   Dry mouth or mouth breathing: No   Night Sweating: sometimes   Teeth grinding/biting: No   Morning headaches: rare   Refreshed Upon Awakening: No      SLEEP RELATED BEHAVIORS:  Parasomnias (walking, talking, eating, violence): No   Leg kicking: No   Restless legs - \"urge to move\": No   Nightmares: No  Recurrent: No   Dream enactment: No      NARCOLEPSY:  Cataplexy: No   Sleep paralysis: No   Sleep attacks: No   Hypnagogic/hypnopompic hallucinations: No     MEDICAL HISTORY  Past Medical History:   Diagnosis Date    Acute appendicitis with localized peritonitis 11/01/2022    Acute postoperative pain 11/01/2022    Apnea, sleep     ASTHMA     Concussion with unknown loss of consciousness status 11/30/2022    Contusion of head, subsequent encounter 11/30/2022    Daytime sleepiness     Low energy 11/18/2019    Memory loss of unknown cause 11/30/2022    Snoring         SURGICAL HISTORY  Past Surgical History:   Procedure Laterality Date    NM LAP,APPENDECTOMY N/A 11/1/2022    Procedure: APPENDECTOMY, LAPAROSCOPIC;  Surgeon: Ritchie Barnett M.D.;  Location: SURGERY [facility];  Service: General        FAMILY HISTORY  Family History   Problem Relation Age of Onset    Lung Disease Mother         cancer    Stroke Mother     Cancer Father         brain     Colorectal Cancer Neg Hx     Breast Cancer Neg Hx     Dementia Neg Hx     Diabetes Neg Hx     Heart Disease Neg Hx     Hypertension Neg Hx     Hyperlipidemia Neg Hx        SOCIAL HISTORY  Social History     Socioeconomic History    Marital status:    Tobacco Use    Smoking status: Every Day     Current packs/day: 0.50     Types: Cigarettes    Smokeless tobacco: Current    Tobacco comments:     Occ ZYN POUCHES and half a pack a day    Vaping Use    " "Vaping status: Never Used   Substance and Sexual Activity    Alcohol use: Not Currently     Comment: rarely     Drug use: No    Sexual activity: Yes        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     No current facility-administered medications for this visit.       REVIEW OF SYSTEMS  Constitutional: Denies fevers, Denies weight changes  Ears/Nose/Throat/Mouth: Denies nasal congestion or sore throat   Cardiovascular: Denies chest pain  Respiratory: Endoreses  shortness of breath, Denies cough  Gastrointestinal/Hepatic: Denies nausea, vomiting  Sleep: see HPI    Physical Examination:  Vitals/ General Appearance:   Weight/BMI: Body mass index is 34.55 kg/m².  /74 (BP Location: Left arm, Patient Position: Sitting, BP Cuff Size: Adult)   Pulse 91   Resp 16   Ht 1.778 m (5' 10\")   Wt 109 kg (240 lb 12.8 oz)   SpO2 94%   Vitals:    02/28/25 1041   BP: 116/74   BP Location: Left arm   Patient Position: Sitting   BP Cuff Size: Adult   Pulse: 91   Resp: 16   SpO2: 94%   Weight: 109 kg (240 lb 12.8 oz)   Height: 1.778 m (5' 10\")       Pt. is alert and oriented to time, place and person. Cooperative and in no apparent distress.     Constitutional: Alert, no distress, well-groomed.  Skin: No rashes in visible areas.  Eye: Round. Conjunctiva clear, lids normal. No icterus.   ENT EXAM  Nasal alae/valves collapsible: No   Nasal septum deviation: Yes  Nasal turbinate hypertrophy: No   Hard palate narrow: No   Hard palate high: No   Soft palate/uvula (Mallampati score): 4  Tongue Scalloping: Yes  Retrognathia: No   Micrognathia: No   Cardiovascular:no murmus/gallops/rubs, normal S1 and S2 heart sounds, regular rate and rhythm  Pulmonary:Clear to auscultation, No wheezes, No crackles.  Neurologic:Awake, alert and oriented x 3, Normal age appropriate gait, No involuntary motions.  Extremities: No clubbing, cyanosis, or edema       ASSESSMENT AND PLAN   Patricio " Marek Lundberg is a 39 y.o. male with obesity, prediabetes, ADD, and snoring.  Presents to Sleep Clinic for evaluation of sleep.     Patricio Lundberg  has symptoms of Obstructive Sleep Apnea (MARVEL). Patricio Lundberg has symptoms of snoring, morning headaches, unrefreshed upon awakening, brain fog, fatigue, daytime. These can interfere with activities of daily living.   ESS 8  Pt has risk factors for MARVEL include obesity and crowded oropharynx.     The pathophysiology of MARVEL and the increased risk of cardiovascular morbidity from untreated MARVEL is discussed in detail with the patient. He  also has ADD, prediabetes, which can be worsened by MARVEL.      We have discussed diagnostic options including in-laboratory, attended polysomnography and home sleep testing. We have also discussed treatment options including airway pressurization, reconstructive otolaryngologic surgery, dental appliances and weight management.     Subsequently,treatment options will be discussed based on the diagnostic study. Meanwhile, He is urged to avoid supine sleep, weight gain, these can worsen MARVEL. He is cautioned against drowsy driving. If He feels sleepy while driving, advised must pull over for a break/nap, rather than persist on the road, in the interest of Pt's own safety and that of others on the road.    Plan  -  He  will be scheduled for an overnight  HST to assess sleep related breathing disorder.  - Follow up 1-2 weeks after sleep study to discuss results and treatment options moving forward   -Advised to reach out via MyChart or by phone with any questions or concerns.

## 2025-03-17 ENCOUNTER — HOME STUDY (OUTPATIENT)
Dept: SLEEP MEDICINE | Facility: MEDICAL CENTER | Age: 40
End: 2025-03-17
Attending: STUDENT IN AN ORGANIZED HEALTH CARE EDUCATION/TRAINING PROGRAM
Payer: COMMERCIAL

## 2025-03-17 DIAGNOSIS — E29.1 HYPOGONADISM MALE: ICD-10-CM

## 2025-03-17 DIAGNOSIS — R06.83 SNORING: ICD-10-CM

## 2025-03-17 DIAGNOSIS — G47.19 EXCESSIVE DAYTIME SLEEPINESS: ICD-10-CM

## 2025-03-17 DIAGNOSIS — G47.30 SLEEP DISORDER BREATHING: ICD-10-CM

## 2025-03-18 PROCEDURE — 95800 SLP STDY UNATTENDED: CPT | Performed by: STUDENT IN AN ORGANIZED HEALTH CARE EDUCATION/TRAINING PROGRAM

## 2025-03-21 NOTE — PROCEDURES
DIAGNOSTIC HOME SLEEP TEST (HST) REPORT WatchPAT      PATIENT ID:  NAME:  Patricio Lundberg  MRN:               [id number]  YOB: 1985  DATE OF STUDY: 3/18/2025      Impression:     This study shows evidence of:      1. Moderate obstructive sleep apnea with PAT apnea hypopnea index(pAHI) of 19.6 per hour.  PAT respiratory disturbance index (pRDI) was 23.3 per hour. These findings are based on 7 channels recording of PAT signal with sleep staging, heart rate, pulse oximetry, actigraphy, body position, snoring and respiratory movement.     2. Oxygenation O2 Sat. mean O2 sat was 89%,  dwayne was 81%,  and maximum O2 at 95 %. O2 sat was at or  below 88% for 66 min of evaluation time. Oxygen Desaturation (>=4%) Index was 8.9/hr. AVG HR was 71 BPM.      TECHNICAL DESCRIPTION: Patient underwent home sleep apnea testing with peripheral arterial tone signal (WatchPAT™). This is a Type IV portable monitor and device per Medicare. Monitoring was done with 7 channels recording of PAT signal with sleep staging, heart rate, pulse oximetry, actigraphy, body position, snoring and respiratory movement. Prior to using the device, the patient received verbal and written instructions for its application and was provided with the help desk phone number for additional telephonic instruction with 24-hour availability of qualified personnel to answer questions.    Respiratory events:        General sleep summary: . Total recording time is 7 hours and 24 minutes and total Sleep time is 6 hours and 36 minutes. The patient spent 0 minutes in the supine position and 396 minutes in the nonsupine position.      Recommendations:    1. CPAP titration study vs Auto CPAP trial.     2. In general patients with sleep apnea are advised to avoid alcohol and sedatives and to not operate a motor vehicle while drowsy. In some cases alternative treatment options may prove effective in resolving sleep apnea in these options include upper  airway surgery, the use of a dental orthotic or weight loss and positional therapy. Clinical correlation is required.

## 2025-04-15 ENCOUNTER — OFFICE VISIT (OUTPATIENT)
Dept: SLEEP MEDICINE | Facility: MEDICAL CENTER | Age: 40
End: 2025-04-15
Attending: STUDENT IN AN ORGANIZED HEALTH CARE EDUCATION/TRAINING PROGRAM
Payer: COMMERCIAL

## 2025-04-15 VITALS
OXYGEN SATURATION: 94 % | RESPIRATION RATE: 16 BRPM | SYSTOLIC BLOOD PRESSURE: 112 MMHG | HEART RATE: 83 BPM | DIASTOLIC BLOOD PRESSURE: 74 MMHG | BODY MASS INDEX: 35.58 KG/M2 | HEIGHT: 69 IN | WEIGHT: 240.2 LBS

## 2025-04-15 DIAGNOSIS — G47.19 EXCESSIVE DAYTIME SLEEPINESS: ICD-10-CM

## 2025-04-15 DIAGNOSIS — R06.83 SNORING: ICD-10-CM

## 2025-04-15 DIAGNOSIS — G47.33 OSA (OBSTRUCTIVE SLEEP APNEA): Primary | ICD-10-CM

## 2025-04-15 PROCEDURE — 3078F DIAST BP <80 MM HG: CPT | Performed by: STUDENT IN AN ORGANIZED HEALTH CARE EDUCATION/TRAINING PROGRAM

## 2025-04-15 PROCEDURE — 99213 OFFICE O/P EST LOW 20 MIN: CPT | Performed by: STUDENT IN AN ORGANIZED HEALTH CARE EDUCATION/TRAINING PROGRAM

## 2025-04-15 PROCEDURE — 3074F SYST BP LT 130 MM HG: CPT | Performed by: STUDENT IN AN ORGANIZED HEALTH CARE EDUCATION/TRAINING PROGRAM

## 2025-04-15 PROCEDURE — 99211 OFF/OP EST MAY X REQ PHY/QHP: CPT | Performed by: STUDENT IN AN ORGANIZED HEALTH CARE EDUCATION/TRAINING PROGRAM

## 2025-04-15 ASSESSMENT — FIBROSIS 4 INDEX: FIB4 SCORE: 0.73

## 2025-04-15 NOTE — PROGRESS NOTES
[facility] Follow-up Visit    CC: Follow-up to discuss sleep study results      HPI:  Patricio Lundberg is a 39 y.o.male  with prediabetes, obesity, ADD, snoring, and moderate obstructive sleep apnea.  Presents today to discuss management of obstructive sleep apnea during home sleep study.    He reports night sleep study was not the best night sleep.  He did see his study results prior to today's visit.    At last visit was discussed that he does snore in his sleep he does have brain fog during the day and low energy and tiredness at times.    Sleep History  3/18/2025 HST WatchPAT study showed moderate obstructive sleep apnea and overall AHI of 19.6 events an hour, minimal oxygen saturation 81%, time at or below 88% saturation of 66 minutes    Patient Active Problem List    Diagnosis Date Noted    Prediabetes 08/23/2024    Hepatic steatosis 08/23/2024    Gastroenteritis 08/08/2024    Hypogonadism male 07/26/2022    History of recent stressful life event- going through divorce 07/26/2022    Obesity due to excess calories with serious comorbidity 07/26/2022    Excessive daytime sleepiness 11/18/2019    Attention deficit disorder (ADD) without hyperactivity 11/18/2019    Mixed hyperlipidemia- mild no meds 04/23/2019    Elevated liver enzymes 04/23/2019    Tobacco dependence 02/19/2019    Obesity (BMI 30-39.9) 06/19/2018       Past Medical History:   Diagnosis Date    Acute appendicitis with localized peritonitis 11/01/2022    Acute postoperative pain 11/01/2022    Apnea, sleep     ASTHMA     Concussion with unknown loss of consciousness status 11/30/2022    Contusion of head, subsequent encounter 11/30/2022    Daytime sleepiness     Low energy 11/18/2019    Memory loss of unknown cause 11/30/2022    Snoring         Past Surgical History:   Procedure Laterality Date    DC LAP,APPENDECTOMY N/A 11/1/2022    Procedure: APPENDECTOMY, LAPAROSCOPIC;  Surgeon: Ritchie Barnett M.D.;  Location: SURGERY [facility] [place]  "[facility];  Service: General       Family History   Problem Relation Age of Onset    Lung Disease Mother         cancer    Stroke Mother     Cancer Father         brain     Colorectal Cancer Neg Hx     Breast Cancer Neg Hx     Dementia Neg Hx     Diabetes Neg Hx     Heart Disease Neg Hx     Hypertension Neg Hx     Hyperlipidemia Neg Hx        Social History     Socioeconomic History    Marital status:      Spouse name: Not on file    Number of children: Not on file    Years of education: Not on file    Highest education level: Not on file   Occupational History    Not on file   Tobacco Use    Smoking status: Every Day     Current packs/day: 0.50     Types: Cigarettes    Smokeless tobacco: Current    Tobacco comments:     Occ [employer] POUCHES and half a pack a day    Vaping Use    Vaping status: Never Used   Substance and Sexual Activity    Alcohol use: Not Currently     Comment: rarely     Drug use: No    Sexual activity: Yes   Other Topics Concern    Not on file   Social History Narrative    Not on file     Social Drivers of Health     Financial Resource Strain: Not on file   Food Insecurity: Not on file   Transportation Needs: Not on file   Physical Activity: Not on file   Stress: Not on file   Social Connections: Not on file   Intimate Partner Violence: Not on file   Housing Stability: Not on file       No current outpatient medications on file.     No current facility-administered medications for this visit.        ALLERGIES: Toradol    ROS  Constitutional: Denies fevers, Denies weight changes  Ears/Nose/Throat/Mouth: Denies nasal congestion or sore throat   Cardiovascular: Denies chest pain  Respiratory: Denies shortness of breath, Denies cough  Gastrointestinal/Hepatic: Denies nausea, vomiting  Sleep: see HPI      PHYSICAL EXAM  /74 (BP Location: Left arm, Patient Position: Sitting, BP Cuff Size: Adult)   Pulse 83   Resp 16   Ht 1.753 m (5' 9\")   Wt 109 kg (240 lb 3.2 oz)   SpO2 94%   BMI 35.47 kg/m² "   Appearance: Well-nourished, well-developed, no acute distress  Eyes:  No scleral icterus , EOMI  Musculoskeletal:  Grossly normal; gait and station normal; digits and nails normal  Skin:  No rashes, petechiae, cyanosis  Neurologic: without focal signs; oriented to person, time, place, and purpose; judgement intact      Medical Decision Making   Assessment and Plan  Patricio Lundberg is a 39 y.o.male  with prediabetes, obesity, ADD, snoring, and moderate obstructive sleep apnea.  Presents today to discuss management of obstructive sleep apnea during home sleep study.    The medical record was reviewed.    Obstructive sleep apnea   Reviewed recent HST with patient showing an AHI of 19.6,  and Min Oxygen saturation of 81%.  Time at or below 88% saturation 66 minutes  Based on sleep study and symptoms meets criteria for Moderate  obstructive sleep apnea.   We discussed the pathophysiology of obstructive sleep apnea (MARVEL) and risk factors for the disease. We also discussed possible consequences of untreated MARVEL, including excessive daytime sleepiness and fatigue, cognitive dysfunction, cardiovascular complications such as elevated blood pressure, heart attacks, cardiac arrhythmias, and strokes. We discussed how MARVEL typically gets worse with age. We discussed treatment options for MARVEL, including the gold standard therapy (PAP), alternative options such as a mandibular advancement device (custom-made oral appliances) and surgeries. We will proceed CPAP therapy.     RECOMMENDATIONS  -Start Auto CPAP at pressures 5-12 cm H2O  -Discussed importance of adherence/compliance   -Prescription generated for supplies   -Patient counseled to avoid driving when sleepy. Encouraged to anticipate sleepiness, consider taking a 10 min nap prior to driving, alternate with another , or pull over if sleepy while driving  -Advised to contact our office or myself with any questions via MyChart  -Follow up in 3 months or sooner if  needed      Return in about 3 months (around 7/15/2025).      Please note portions of this record was created using voice recognition software. I have made every reasonable attempt to correct obvious errors, but I expect that there are errors of grammar and possibly content I did not discover before finalizing the note.

## 2025-08-18 ENCOUNTER — OFFICE VISIT (OUTPATIENT)
Dept: SLEEP MEDICINE | Facility: MEDICAL CENTER | Age: 40
End: 2025-08-18
Attending: STUDENT IN AN ORGANIZED HEALTH CARE EDUCATION/TRAINING PROGRAM
Payer: COMMERCIAL

## 2025-08-18 VITALS
OXYGEN SATURATION: 96 % | WEIGHT: 236 LBS | SYSTOLIC BLOOD PRESSURE: 114 MMHG | RESPIRATION RATE: 16 BRPM | HEIGHT: 70 IN | BODY MASS INDEX: 33.79 KG/M2 | HEART RATE: 107 BPM | DIASTOLIC BLOOD PRESSURE: 74 MMHG

## 2025-08-18 DIAGNOSIS — G47.33 OSA (OBSTRUCTIVE SLEEP APNEA): Primary | ICD-10-CM

## 2025-08-18 PROCEDURE — 99213 OFFICE O/P EST LOW 20 MIN: CPT | Performed by: STUDENT IN AN ORGANIZED HEALTH CARE EDUCATION/TRAINING PROGRAM

## 2025-08-18 PROCEDURE — 3078F DIAST BP <80 MM HG: CPT | Performed by: STUDENT IN AN ORGANIZED HEALTH CARE EDUCATION/TRAINING PROGRAM

## 2025-08-18 PROCEDURE — 99214 OFFICE O/P EST MOD 30 MIN: CPT | Performed by: STUDENT IN AN ORGANIZED HEALTH CARE EDUCATION/TRAINING PROGRAM

## 2025-08-18 ASSESSMENT — FIBROSIS 4 INDEX: FIB4 SCORE: 0.73

## (undated) DEVICE — CLIP MED LG INTNL HRZN TI ESCP - (20/BX)

## (undated) DEVICE — SET LEADWIRE 5 LEAD BEDSIDE DISPOSABLE ECG (1SET OF 5/EA)

## (undated) DEVICE — CANNULA W/SEAL 5X100 Z-THRE - ADED KII (12/BX)

## (undated) DEVICE — SODIUM CHL IRRIGATION 0.9% 1000ML (12EA/CA)

## (undated) DEVICE — SLEEVE, VASO, THIGH, MED

## (undated) DEVICE — GLOVE BIOGEL SZ 7.5 SURGICAL PF LTX - (50PR/BX 4BX/CA)

## (undated) DEVICE — TROCAR Z THREAD12MM OPTICAL - NON BLADED (6/BX)

## (undated) DEVICE — TROCAR 5X100 NON BLADED Z-TH - READ KII (6/BX)

## (undated) DEVICE — CHLORAPREP 26 ML APPLICATOR - ORANGE TINT(25/CA)

## (undated) DEVICE — TUBING CLEARLINK DUO-VENT - C-FLO (48EA/CA)

## (undated) DEVICE — SENSOR OXIMETER ADULT SPO2 RD SET (20EA/BX)

## (undated) DEVICE — VESSEL DIVIDER SEAL LAP LIGASURE 37CM (6EA/BX)

## (undated) DEVICE — SET SUCTION/IRRIGATION WITH DISPOSABLE TIP (6/CA )PART #0250-070-520 IS A SUB

## (undated) DEVICE — SUTURE GENERAL

## (undated) DEVICE — STAPLER 45MM ARTICULATING - ENDO (3EA/BX)

## (undated) DEVICE — SUTURE 0 VICRYL PLUS UR-6 - 27 INCH (36/BX)

## (undated) DEVICE — DERMABOND ADVANCED - (12EA/BX)

## (undated) DEVICE — SET TUBING PNEUMOCLEAR HIGH FLOW SMOKE EVACUATION (10EA/BX)

## (undated) DEVICE — STAPLE 45MM BLUE 4.5MM (12EA/BX)

## (undated) DEVICE — LACTATED RINGERS INJ 1000 ML - (14EA/CA 60CA/PF)

## (undated) DEVICE — COVER LIGHT HANDLE ALC PLUS DISP (18EA/BX)

## (undated) DEVICE — SUTURE 4-0 MONOCRYL PLUS PS-2 - 27 INCH (36/BX)

## (undated) DEVICE — NEEDLE INSFL 120MM 14GA VRRS - (20/BX)

## (undated) DEVICE — SUCTION INSTRUMENT YANKAUER BULBOUS TIP W/O VENT (50EA/CA)

## (undated) DEVICE — SYRINGE 30 ML LL (56/BX)

## (undated) DEVICE — CANISTER SUCTION 3000ML MECHANICAL FILTER AUTO SHUTOFF MEDI-VAC NONSTERILE LF DISP  (40EA/CA)

## (undated) DEVICE — SET EXTENSION WITH 2 PORTS (48EA/CA) ***PART #2C8610 IS A SUBSTITUTE*****

## (undated) DEVICE — PACK LAP CHOLE OR - (2EA/CA)

## (undated) DEVICE — ELECTRODE DUAL RETURN W/ CORD - (50/PK)